# Patient Record
Sex: FEMALE | Race: WHITE | NOT HISPANIC OR LATINO | Employment: UNEMPLOYED | URBAN - METROPOLITAN AREA
[De-identification: names, ages, dates, MRNs, and addresses within clinical notes are randomized per-mention and may not be internally consistent; named-entity substitution may affect disease eponyms.]

---

## 2017-12-21 DIAGNOSIS — F51.01 PRIMARY INSOMNIA: Primary | ICD-10-CM

## 2017-12-21 DIAGNOSIS — F41.9 CHRONIC ANXIETY: ICD-10-CM

## 2017-12-21 RX ORDER — ZOLPIDEM TARTRATE 10 MG/1
10 TABLET ORAL DAILY
Refills: 0 | COMMUNITY
Start: 2017-11-16 | End: 2018-01-08 | Stop reason: SDUPTHER

## 2017-12-21 RX ORDER — LORAZEPAM 2 MG/1
2 TABLET ORAL DAILY PRN
Refills: 0 | COMMUNITY
Start: 2017-11-16 | End: 2019-03-27 | Stop reason: SDUPTHER

## 2017-12-25 RX ORDER — LORAZEPAM 2 MG/1
2 TABLET ORAL 2 TIMES DAILY
Qty: 180 TABLET | Refills: 0 | OUTPATIENT
Start: 2017-12-25

## 2017-12-25 RX ORDER — ZOLPIDEM TARTRATE 10 MG/1
10 TABLET ORAL NIGHTLY PRN
Qty: 90 TABLET | Refills: 0 | OUTPATIENT
Start: 2017-12-25 | End: 2018-03-25

## 2017-12-25 NOTE — TELEPHONE ENCOUNTER
Medication refused due to failing protocol.    Requested Prescriptions   Pending Prescriptions Disp Refills    LORazepam (ATIVAN) 2 MG Tab 180 tablet 0     Sig: Take 1 tablet (2 mg total) by mouth 2 (two) times daily.    Anxiolytics Refill Protocol Failed    12/21/2017  4:38 PM       Failed - Patient seen within 3 months    Last visit with Shari Herring MD: Visit date not found  Last visit in [address] [facility]: Visit date not found    Patient's next visit in [address] [facility]: 1/24/2018          Failed - Med not refilled within 4 weeks       Passed - Patient not pregnant       zolpidem (AMBIEN) 10 mg Tab 90 tablet 0     Sig: Take 1 tablet (10 mg total) by mouth nightly as needed.    There is no refill protocol information for this order      Signed Prescriptions Disp Refills    zolpidem (AMBIEN) 10 mg Tab  0     Sig: Take 10 mg by mouth Daily.    There is no refill protocol information for this order       LORazepam (ATIVAN) 2 MG Tab  0     Sig: Take 2 mg by mouth 2 (two) times daily.    There is no refill protocol information for this order

## 2018-01-08 DIAGNOSIS — G47.00 INSOMNIA, UNSPECIFIED TYPE: Primary | ICD-10-CM

## 2018-01-08 RX ORDER — ZOLPIDEM TARTRATE 10 MG/1
10 TABLET ORAL NIGHTLY PRN
Qty: 90 TABLET | Refills: 0 | Status: SHIPPED | OUTPATIENT
Start: 2018-01-08 | End: 2018-04-18 | Stop reason: SDUPTHER

## 2018-01-24 ENCOUNTER — OFFICE VISIT (OUTPATIENT)
Dept: INTERNAL MEDICINE | Facility: CLINIC | Age: 48
End: 2018-01-24
Payer: MEDICAID

## 2018-01-24 VITALS
HEART RATE: 84 BPM | WEIGHT: 134 LBS | BODY MASS INDEX: 23.74 KG/M2 | DIASTOLIC BLOOD PRESSURE: 72 MMHG | HEIGHT: 63 IN | TEMPERATURE: 98 F | SYSTOLIC BLOOD PRESSURE: 124 MMHG | OXYGEN SATURATION: 98 % | RESPIRATION RATE: 18 BRPM

## 2018-01-24 DIAGNOSIS — Z00.00 ROUTINE MEDICAL EXAM: ICD-10-CM

## 2018-01-24 DIAGNOSIS — Z86.39 HISTORY OF THYROTOXICOSIS: ICD-10-CM

## 2018-01-24 DIAGNOSIS — E28.39 ESTROGEN DEFICIENCY: Primary | ICD-10-CM

## 2018-01-24 DIAGNOSIS — F41.9 CHRONIC ANXIETY: ICD-10-CM

## 2018-01-24 DIAGNOSIS — F51.02 ADJUSTMENT INSOMNIA: ICD-10-CM

## 2018-01-24 DIAGNOSIS — Z12.39 BREAST CANCER SCREENING: ICD-10-CM

## 2018-01-24 PROCEDURE — 99214 OFFICE O/P EST MOD 30 MIN: CPT | Mod: ,,, | Performed by: INTERNAL MEDICINE

## 2018-01-24 RX ORDER — DROSPIRENONE AND ETHINYL ESTRADIOL 0.02-3(28)
1 KIT ORAL DAILY
Qty: 28 TABLET | Refills: 2 | Status: SHIPPED | OUTPATIENT
Start: 2018-01-24 | End: 2018-07-24 | Stop reason: CLARIF

## 2018-01-24 RX ORDER — DROSPIRENONE AND ETHINYL ESTRADIOL 0.02-3(28)
KIT ORAL
Refills: 9 | COMMUNITY
Start: 2018-01-03 | End: 2018-01-24

## 2018-01-24 RX ORDER — PROPRANOLOL HYDROCHLORIDE 20 MG/1
20 TABLET ORAL 3 TIMES DAILY
COMMUNITY
End: 2018-01-24

## 2018-01-24 RX ORDER — DROSPIRENONE AND ETHINYL ESTRADIOL 0.02-3(28)
1 KIT ORAL DAILY
COMMUNITY
End: 2018-01-24 | Stop reason: SDUPTHER

## 2018-01-24 RX ORDER — ACYCLOVIR 400 MG/1
1 TABLET ORAL DAILY
Refills: 1 | COMMUNITY
Start: 2017-11-16 | End: 2018-07-24 | Stop reason: SDUPTHER

## 2018-01-24 RX ORDER — METHIMAZOLE 10 MG/1
10 TABLET ORAL DAILY
Refills: 4 | COMMUNITY
Start: 2017-11-02 | End: 2018-01-24

## 2018-01-24 RX ORDER — METHIMAZOLE 10 MG/1
10 TABLET ORAL DAILY
COMMUNITY
End: 2018-07-24

## 2018-01-24 RX ORDER — SUMATRIPTAN SUCCINATE 100 MG/1
100 TABLET ORAL
COMMUNITY
End: 2020-10-22

## 2018-01-24 RX ORDER — CARISOPRODOL 350 MG/1
350 TABLET ORAL 4 TIMES DAILY PRN
COMMUNITY
End: 2018-01-24

## 2018-01-24 RX ORDER — TRAMADOL HYDROCHLORIDE 50 MG/1
50 TABLET ORAL DAILY PRN
COMMUNITY
End: 2021-01-28

## 2018-01-24 NOTE — PROGRESS NOTES
SUBJECTIVE:    Patient ID: Irish Pierce is a 47 y.o. female.    Chief Complaint: Follow-up (3 month) and Medication Refill    HPI     PT IN FOR RECHECK--     CHRONIC ANXIETY--BETTER NOW POST HER DIVORCE BUT ALL ASIDE SHE IS TAKING LESS ATIVAN    INSOMNIA-IMPROVED-STILL NEEDS THE RX    IBS-GOOD-DING [employer] DRINK WHICH KEEPS HER REGULAR-USING A CLEANSING TEA    FOOT PAIN-PINS AND NEEDLES AND NUMBNESS SINCE SHE TORE HER HAMSTRING        Past Medical History:   Diagnosis Date    Abnormal thyroid ultrasound     Adjustment insomnia     Chronic anxiety     Facial eczema     Herpes simplex     Insomnia     Intractable migraine without aura and without status migrainosus     Irritable colon     Myalgia     Paresthesia of right foot     Primary insomnia     Screening for cervical cancer      Social History     Social History    Marital status: Unknown     Spouse name: N/A    Number of children: N/A    Years of education: N/A     Occupational History    Not on file.     Social History Main Topics    Smoking status: Never Smoker    Smokeless tobacco: Never Used    Alcohol use Yes    Drug use: No    Sexual activity: Not on file     Other Topics Concern    Not on file     Social History Narrative    No narrative on file     Past Surgical History:   Procedure Laterality Date    APPENDECTOMY       Family History   Problem Relation Age of Onset    Cancer Mother     Hypertension Father        Review of Systems   Constitutional: Negative for appetite change, chills, diaphoresis, fatigue, fever and unexpected weight change.   HENT: Negative for congestion, ear pain, hearing loss, nosebleeds, postnasal drip, sinus pain, sinus pressure, sneezing, sore throat, tinnitus, trouble swallowing and voice change.    Eyes: Negative for photophobia, pain, itching and visual disturbance.   Respiratory: Negative for apnea, cough, chest tightness, shortness of breath, wheezing and stridor.    Cardiovascular: Negative for  chest pain, palpitations and leg swelling.   Gastrointestinal: Negative for abdominal distention, abdominal pain, blood in stool, constipation, diarrhea, nausea and vomiting.        IBS WITH CONSTIPATION   Endocrine: Negative for cold intolerance, heat intolerance, polydipsia and polyuria.   Genitourinary: Negative for difficulty urinating, dyspareunia, dysuria, flank pain, frequency, hematuria, menstrual problem, pelvic pain, urgency, vaginal discharge and vaginal pain.   Musculoskeletal: Negative for arthralgias, back pain, joint swelling, myalgias, neck pain and neck stiffness.   Skin: Negative for pallor.   Allergic/Immunologic: Negative for environmental allergies and food allergies.   Neurological: Negative for dizziness, tremors, speech difficulty, weakness, light-headedness and numbness.   Hematological: Does not bruise/bleed easily.   Psychiatric/Behavioral: Negative for agitation, confusion, decreased concentration, sleep disturbance and suicidal ideas. The patient is nervous/anxious.           Objective:      Physical Exam   Constitutional: She is oriented to person, place, and time. She appears well-developed and well-nourished. She is cooperative. No distress.   HENT:   Head: Normocephalic and atraumatic.   Right Ear: Tympanic membrane normal.   Left Ear: Tympanic membrane normal.   Nose: Nose normal.   Mouth/Throat: Uvula is midline, oropharynx is clear and moist and mucous membranes are normal.   Eyes: Conjunctivae, EOM and lids are normal. Pupils are equal, round, and reactive to light. Right pupil is round and reactive. Left pupil is round and reactive.   Neck: Trachea normal and normal range of motion. Neck supple. No JVD present. No thyromegaly present.   Cardiovascular: Normal rate, regular rhythm, normal heart sounds and intact distal pulses.    Pulmonary/Chest: Effort normal and breath sounds normal. No tachypnea. No respiratory distress.   Abdominal: Soft. Bowel sounds are normal. There is no  tenderness.   Musculoskeletal: Normal range of motion.   Lymphadenopathy:     She has no cervical adenopathy.   Neurological: She is alert and oriented to person, place, and time. She has normal strength.   Skin: Skin is warm and dry. No rash noted.   Psychiatric: She has a normal mood and affect. Her speech is normal.   Nursing note and vitals reviewed.          Assessment:       1. Estrogen deficiency    2. History of thyrotoxicosis    3. Adjustment insomnia    4. Chronic anxiety    5. Routine medical exam    6. Breast cancer screening         Plan:           Estrogen deficiency  -     drospirenone-ethinyl estradiol (DEYSI) 3-0.02 mg per tablet; Take 1 tablet by mouth once daily.  Dispense: 28 tablet; Refill: 2    History of thyrotoxicosis  -     TSH; Future; Expected date: 01/24/2018    Adjustment insomnia    Chronic anxiety    Routine medical exam  -     CBC auto differential; Future; Expected date: 01/24/2018  -     Comprehensive metabolic panel; Future; Expected date: 01/24/2018  -     Lipid panel; Future; Expected date: 01/24/2018  -     Urinalysis    Breast cancer screening  -     Mammo Digital Screening Bilat without CA

## 2018-04-18 DIAGNOSIS — F51.02 ADJUSTMENT INSOMNIA: ICD-10-CM

## 2018-04-18 RX ORDER — DROSPIRENONE AND ETHINYL ESTRADIOL 0.02-3(28)
1 KIT ORAL DAILY
Qty: 28 TABLET | Refills: 2 | Status: CANCELLED | OUTPATIENT
Start: 2018-04-18

## 2018-04-18 NOTE — TELEPHONE ENCOUNTER
Also pended referral for gyn in case you wanted a gyn to take over Birth control and address overdue pap.

## 2018-04-18 NOTE — TELEPHONE ENCOUNTER
----- Message from Caitlyn Salazar sent at 4/18/2018  8:17 AM CDT -----  Contact: Irish Gasca is requesting a refill of her birth control and Ambien sent to [facility] on [facility]. She also needs a PA for the birth control.

## 2018-04-19 PROBLEM — F51.02 ADJUSTMENT INSOMNIA: Status: ACTIVE | Noted: 2018-04-19

## 2018-04-19 RX ORDER — ZOLPIDEM TARTRATE 10 MG/1
10 TABLET ORAL NIGHTLY PRN
Qty: 30 TABLET | Refills: 2 | Status: SHIPPED | OUTPATIENT
Start: 2018-04-19 | End: 2018-07-24 | Stop reason: SDUPTHER

## 2018-04-19 NOTE — TELEPHONE ENCOUNTER
Please let her know she needs to follow-up with her gynecologist for birth control refill. We don't have an updated Pap in health maintenance. Refill request sent for Ambien.

## 2018-07-21 LAB
ALBUMIN SERPL-MCNC: 4 G/DL (ref 3.5–5.5)
ALBUMIN/GLOB SERPL: 2.4 {RATIO} (ref 1.2–2.2)
ALP SERPL-CCNC: 91 IU/L (ref 39–117)
ALT SERPL-CCNC: 20 IU/L (ref 0–32)
AMBIG ABBREV CMP 14 DEFAULT: NORMAL
AMBIG ABBREV LP DEFAULT: NORMAL
AST SERPL-CCNC: 18 IU/L (ref 0–40)
BASOPHILS # BLD AUTO: 0 X10E3/UL (ref 0–0.2)
BASOPHILS NFR BLD AUTO: 0 %
BILIRUB SERPL-MCNC: 0.2 MG/DL (ref 0–1.2)
BUN SERPL-MCNC: 8 MG/DL (ref 6–24)
BUN/CREAT SERPL: 22 (ref 9–23)
CALCIUM SERPL-MCNC: 9.5 MG/DL (ref 8.7–10.2)
CHLORIDE SERPL-SCNC: 106 MMOL/L (ref 96–106)
CHOLEST SERPL-MCNC: 139 MG/DL (ref 100–199)
CO2 SERPL-SCNC: 22 MMOL/L (ref 20–29)
CREAT SERPL-MCNC: 0.37 MG/DL (ref 0.57–1)
EGFR IF AFRICAN AMERICAN: 148 ML/MIN/1.73
EOSINOPHIL # BLD AUTO: 0.2 X10E3/UL (ref 0–0.4)
EOSINOPHIL NFR BLD AUTO: 4 %
ERYTHROCYTE [DISTWIDTH] IN BLOOD BY AUTOMATED COUNT: 14.3 % (ref 12.3–15.4)
EST. GFR  (NON AFRICAN AMERICAN): 129 ML/MIN/1.73
GLOBULIN SER CALC-MCNC: 1.7 G/DL (ref 1.5–4.5)
GLUCOSE SERPL-MCNC: 141 MG/DL (ref 65–99)
HCT VFR BLD AUTO: 35.5 % (ref 34–46.6)
HDLC SERPL-MCNC: 94 MG/DL
HGB BLD-MCNC: 11.7 G/DL (ref 11.1–15.9)
IMM GRANULOCYTES # BLD: 0 X10E3/UL (ref 0–0.1)
IMM GRANULOCYTES NFR BLD: 0 %
LDLC SERPL CALC-MCNC: 32 MG/DL (ref 0–99)
LYMPHOCYTES # BLD AUTO: 1.9 X10E3/UL (ref 0.7–3.1)
LYMPHOCYTES NFR BLD AUTO: 43 %
MCH RBC QN AUTO: 27.6 PG (ref 26.6–33)
MCHC RBC AUTO-ENTMCNC: 33 G/DL (ref 31.5–35.7)
MCV RBC AUTO: 84 FL (ref 79–97)
MONOCYTES # BLD AUTO: 0.7 X10E3/UL (ref 0.1–0.9)
MONOCYTES NFR BLD AUTO: 15 %
NEUTROPHILS # BLD AUTO: 1.7 X10E3/UL (ref 1.4–7)
NEUTROPHILS NFR BLD AUTO: 38 %
PLATELET # BLD AUTO: 188 X10E3/UL (ref 150–379)
POTASSIUM SERPL-SCNC: 4.1 MMOL/L (ref 3.5–5.2)
PROT SERPL-MCNC: 5.7 G/DL (ref 6–8.5)
RBC # BLD AUTO: 4.24 X10E6/UL (ref 3.77–5.28)
SODIUM SERPL-SCNC: 142 MMOL/L (ref 134–144)
TRIGL SERPL-MCNC: 65 MG/DL (ref 0–149)
TSH SERPL DL<=0.005 MIU/L-ACNC: <0.006 UIU/ML (ref 0.45–4.5)
VLDLC SERPL CALC-MCNC: 13 MG/DL (ref 5–40)
WBC # BLD AUTO: 4.5 X10E3/UL (ref 3.4–10.8)

## 2018-07-23 DIAGNOSIS — R79.89 DECREASED THYROID STIMULATING HORMONE (TSH) LEVEL: Primary | ICD-10-CM

## 2018-07-23 NOTE — TELEPHONE ENCOUNTER
Get the thyroid studies I ordered prior to recheck and get an A1C when she comes in-we will review the results on clinic visit

## 2018-07-24 ENCOUNTER — OFFICE VISIT (OUTPATIENT)
Dept: INTERNAL MEDICINE | Facility: CLINIC | Age: 48
End: 2018-07-24
Payer: MEDICAID

## 2018-07-24 VITALS
WEIGHT: 131 LBS | DIASTOLIC BLOOD PRESSURE: 74 MMHG | HEART RATE: 99 BPM | OXYGEN SATURATION: 98 % | HEIGHT: 63 IN | RESPIRATION RATE: 17 BRPM | SYSTOLIC BLOOD PRESSURE: 136 MMHG | BODY MASS INDEX: 23.21 KG/M2

## 2018-07-24 DIAGNOSIS — Z86.39 HISTORY OF THYROTOXICOSIS: ICD-10-CM

## 2018-07-24 DIAGNOSIS — Z86.19 HISTORY OF HERPES LABIALIS: ICD-10-CM

## 2018-07-24 DIAGNOSIS — R79.89 DECREASED THYROID STIMULATING HORMONE (TSH) LEVEL: Primary | ICD-10-CM

## 2018-07-24 DIAGNOSIS — R73.09 ELEVATED GLUCOSE: ICD-10-CM

## 2018-07-24 DIAGNOSIS — F51.02 ADJUSTMENT INSOMNIA: ICD-10-CM

## 2018-07-24 PROCEDURE — 99213 OFFICE O/P EST LOW 20 MIN: CPT | Mod: ,,, | Performed by: INTERNAL MEDICINE

## 2018-07-24 PROCEDURE — 83036 HEMOGLOBIN GLYCOSYLATED A1C: CPT | Mod: QW,,, | Performed by: INTERNAL MEDICINE

## 2018-07-24 RX ORDER — ZOLPIDEM TARTRATE 10 MG/1
10 TABLET ORAL NIGHTLY PRN
Qty: 30 TABLET | Refills: 2 | Status: SHIPPED | OUTPATIENT
Start: 2018-07-24 | End: 2018-10-24 | Stop reason: SDUPTHER

## 2018-07-24 RX ORDER — ACYCLOVIR 400 MG/1
400 TABLET ORAL DAILY
Qty: 30 TABLET | Refills: 1 | Status: SHIPPED | OUTPATIENT
Start: 2018-07-24 | End: 2019-07-23 | Stop reason: SDUPTHER

## 2018-07-24 NOTE — PROGRESS NOTES
"  SUBJECTIVE:    Patient ID: Irish Pierce is a 47 y.o. female.    Chief Complaint: Thyroid Problem    HPI     She comes for thyroid evaluation-TSH is low-hx hyperthyroidism on Rx but she stopped it after last ov--she has no signs of hyperthyroidism but it looks like she is going to relapse which needs to be evaluated again    NF glucose was 141--but we need to do AC to check --AC 4.7    Insomnia-without Ambien sleep is poor      Past Medical History:   Diagnosis Date    Abnormal thyroid ultrasound     Adjustment insomnia     Chronic anxiety     Facial eczema     Herpes simplex     Insomnia     Intractable migraine without aura and without status migrainosus     Irritable colon     Myalgia     Paresthesia of right foot     Primary insomnia     Screening for cervical cancer      Social History     Social History    Marital status: Unknown     Spouse name: N/A    Number of children: N/A    Years of education: N/A     Occupational History    Not on file.     Social History Main Topics    Smoking status: Never Smoker    Smokeless tobacco: Never Used    Alcohol use Yes    Drug use: No    Sexual activity: Not on file     Other Topics Concern    Not on file     Social History Narrative    No narrative on file     Past Surgical History:   Procedure Laterality Date    APPENDECTOMY       Family History   Problem Relation Age of Onset    Cancer Mother     Hypertension Father      Vitals:    07/24/18 0937   BP: 136/74   Pulse: 99   Resp: 17   SpO2: 98%   Weight: 59.4 kg (131 lb)   Height: 5' 3" (1.6 m)       Review of Systems   Constitutional: Negative for appetite change, chills, diaphoresis, fatigue and fever.   HENT: Negative for congestion, ear pain, hearing loss, sore throat and trouble swallowing.    Eyes: Negative for photophobia, pain and visual disturbance.   Respiratory: Negative for cough, chest tightness and shortness of breath.    Cardiovascular: Negative for chest pain, palpitations and " leg swelling.   Gastrointestinal: Negative for abdominal pain, blood in stool, constipation, diarrhea, nausea and vomiting.   Endocrine: Negative for cold intolerance and heat intolerance.   Genitourinary: Negative for difficulty urinating, flank pain, pelvic pain and vaginal pain.   Musculoskeletal: Negative for arthralgias and myalgias.   Skin: Negative for rash.   Allergic/Immunologic: Negative for immunocompromised state.   Neurological: Negative for dizziness, weakness, light-headedness and headaches.   Hematological: Negative for adenopathy. Does not bruise/bleed easily.   Psychiatric/Behavioral: Negative for confusion, self-injury and suicidal ideas.          Objective:      Physical Exam   Constitutional: She is oriented to person, place, and time. She appears well-developed and well-nourished. She is cooperative.   HENT:   Head: Normocephalic and atraumatic.   Right Ear: Tympanic membrane normal.   Left Ear: Tympanic membrane normal.   Eyes: Conjunctivae, EOM and lids are normal. Pupils are equal, round, and reactive to light. Lids are everted and swept, no foreign bodies found. Right pupil is round and reactive. Left pupil is round and reactive.   Neck: Trachea normal and normal range of motion. Neck supple.   Cardiovascular: Normal rate, regular rhythm, S1 normal, S2 normal, normal heart sounds and intact distal pulses.    Pulmonary/Chest: Breath sounds normal.   Abdominal: Soft. Bowel sounds are normal. There is no rigidity and no guarding.   Musculoskeletal: Normal range of motion.   Lymphadenopathy:     She has no cervical adenopathy.     She has no axillary adenopathy.   Neurological: She is alert and oriented to person, place, and time.   Skin: Skin is warm and dry. Capillary refill takes less than 2 seconds.   Psychiatric: She has a normal mood and affect. Her behavior is normal. Judgment and thought content normal.   Nursing note and vitals reviewed.          Assessment:       1. Decreased thyroid  stimulating hormone (TSH) level    2. Adjustment insomnia    3. History of thyrotoxicosis    4. History of herpes labialis    5. Elevated glucose         Plan:           Decreased thyroid stimulating hormone (TSH) level  -     ST., free; Future; Expected date: 07/24/2018  -     ST., free; Future; Expected date: 07/24/2018    Adjustment insomnia  -     zolpidem (AMBIEN) 10 mg Tab; Take 1 tablet (10 mg total) by mouth nightly as needed.  Dispense: 30 tablet; Refill: 2    History of thyrotoxicosis        -      6 and 24 hour thyroid uptake if FT# and FT elevated    History of herpes labialis  -     acyclovir (ZOVIRAX) 400 MG tablet; Take 1 tablet (400 mg total) by mouth Daily.  Dispense: 30 tablet; Refill: 1    Elevated glucose  -     Hemoglobin AC, PO CT

## 2018-07-25 LAB — HBA1C MFR BLD: 4.7 %

## 2018-10-23 ENCOUNTER — TELEPHONE (OUTPATIENT)
Dept: FAMILY MEDICINE | Facility: CLINIC | Age: 48
End: 2018-10-23

## 2018-10-24 ENCOUNTER — OFFICE VISIT (OUTPATIENT)
Dept: FAMILY MEDICINE | Facility: CLINIC | Age: 48
End: 2018-10-24
Payer: MEDICAID

## 2018-10-24 VITALS
SYSTOLIC BLOOD PRESSURE: 134 MMHG | BODY MASS INDEX: 23.21 KG/M2 | RESPIRATION RATE: 14 BRPM | OXYGEN SATURATION: 98 % | TEMPERATURE: 98 F | DIASTOLIC BLOOD PRESSURE: 60 MMHG | HEART RATE: 102 BPM | WEIGHT: 131 LBS | HEIGHT: 63 IN

## 2018-10-24 DIAGNOSIS — D72.820 LYMPHOCYTOSIS: ICD-10-CM

## 2018-10-24 DIAGNOSIS — E05.90 HYPERTHYROIDISM: Primary | ICD-10-CM

## 2018-10-24 DIAGNOSIS — Z86.39 HISTORY OF THYROTOXICOSIS: ICD-10-CM

## 2018-10-24 DIAGNOSIS — F51.02 ADJUSTMENT INSOMNIA: ICD-10-CM

## 2018-10-24 DIAGNOSIS — D70.9 NEUTROPENIA, UNSPECIFIED TYPE: ICD-10-CM

## 2018-10-24 PROCEDURE — 99214 OFFICE O/P EST MOD 30 MIN: CPT | Mod: ,,, | Performed by: INTERNAL MEDICINE

## 2018-10-24 RX ORDER — METHIMAZOLE 10 MG/1
10 TABLET ORAL DAILY
Qty: 30 TABLET | Refills: 3 | Status: SHIPPED | OUTPATIENT
Start: 2018-10-24 | End: 2019-03-28

## 2018-10-24 RX ORDER — ZOLPIDEM TARTRATE 10 MG/1
10 TABLET ORAL NIGHTLY PRN
Qty: 30 TABLET | Refills: 2 | Status: SHIPPED | OUTPATIENT
Start: 2018-10-24 | End: 2018-12-05 | Stop reason: SDUPTHER

## 2018-10-24 RX ORDER — PROPRANOLOL HYDROCHLORIDE 10 MG/1
10 TABLET ORAL 3 TIMES DAILY
Qty: 90 TABLET | Refills: 2 | Status: SHIPPED | OUTPATIENT
Start: 2018-10-24 | End: 2019-05-05 | Stop reason: SDUPTHER

## 2018-10-24 RX ORDER — METHIMAZOLE 10 MG/1
1 TABLET ORAL DAILY
Refills: 3 | COMMUNITY
Start: 2018-09-29 | End: 2018-10-24 | Stop reason: SDUPTHER

## 2018-10-24 NOTE — PATIENT INSTRUCTIONS
Hyperthyroidism    You have hyperthyroidism. This means you have a thyroid gland that makes too much thyroid hormone. This hormone is vital to body growth and metabolism. If you make too much thyroid hormone, many body processes speed up and may not work right. This can cause symptoms throughout the body.  There are a number of causes of hyperthyroidism. The most common cause is Graves disease. This occurs when the bodys immune system causes the thyroid to grow and make more thyroid hormone than needed.  Symptoms of hyperthyroidism include:  · Nervousness, anxiety, irritability  · Shaking (tremors) that affects the hands and fingers  · Weight loss despite having a normal or increased appetite  · Low tolerance to heat  · Sweating more than normal  · Fast or irregular heartbeat  · Lighter or irregular periods (women only)  · More frequent bowel movements  · Enlarged thyroid gland (goiter)  · Bulging eyes  · Problems sleeping  · Muscle weakness  · Fatigue  · Swelling of the hands, ankles, or feet (older adults only)  Treatment for hyperthyroidism may include taking medicines. For instance, antithyroid medicines may be prescribed. These help lower the amount of thyroid hormone made by the thyroid gland. Beta-blockers may be prescribed as well. Tips for taking medicines are given below.  Radioiodine ablation or surgery may also be advised. Your healthcare provider will tell you more about these options if needed.  Home care  Tips for taking medicines  · Take any medicines youre prescribed as directed.  · Take your medicine at the same times each day.  · Use a pillbox labeled with the days of the week. This will help you remember to take your medicine each day.  · Tell your provider if you have any side effects from your medicines that bother you.  · Never stop taking medicines on your own. If you do, your symptoms will return.  General care  · Always talk with your provider before trying other medicines or  treatments for your thyroid problem.  · If you see other healthcare providers, be sure to let them know about your thyroid problem.  Follow-up care  See your healthcare provider for checkups as advised. You may need regular tests to check the level of thyroid hormone in your blood.  When to seek medical advice  Call your healthcare provider right away if any of these occur:  · New symptoms occur  · Symptoms return, continue, or worsen even after treatment  · Extreme fatigue  · Puffy hands, face, or feet  · Confusion  Call 911  Call 911 right away if any of these occur:  · Fainting  · Chest pain  · Shortness of breath or trouble breathing  Date Last Reviewed: 8/24/2015 © 2000-2017 [employer]. [address] [place] [address], [place] [address]. All rights reserved. This information is not intended as a substitute for professional medical care. Always follow your healthcare professional's instructions.        Methimazole tablets  What is this medicine?  METHIMAZOLE (meth IM a zole) prevents the thyroid gland from producing too much thyroid hormone. It is used to treat a condition known as hyperthyroidism.  How should I use this medicine?  Take this medicine by mouth with a glass of water. Follow the directions on the prescription label. You can take this medicine with or without food. However, you should always take it the same way to make sure the effects are the same. Take your doses at regular intervals. Do not take your medicine more often than directed. Do not stop taking this medicine except on the advice of your doctor or health care professional.  Talk to your pediatrician regarding the use of this medicine in children. Special care may be needed. While this drug may be prescribed for children for selected conditions, precautions do apply.  What side effects may I notice from receiving this medicine?  Side effects that you should report to your doctor or health care professional as soon as  possible:  · black, tarry stools  · fever, sore throat, hoarseness  · numbness or tingling in the hands or feet  · severe redness or itching of the skin, or dry cracked skin  · stomach pain  · swelling of the feet or legs  · unusual bleeding or bruising, pinpoint red spots on the skin  · unusual or sudden weight increase  · unusually weak or tired  · yellowing of skin or eyes  Side effects that usually do not require medical attention (report to your doctor or health care professional if they continue or are bothersome):  · headache  · nausea, vomiting  · mild skin rash, itching  · muscle aches and pains  What may interact with this medicine?  Do not take this medicine with any of the following medications:  · sodium iodide  · thyroid hormones  This medicine may also interact with the following medications:  · certain medicines for high blood pressure like metoprolol and propranolol  · digoxin  · theophylline  · warfarin  What if I miss a dose?  If you miss a dose, take it as soon as you can. If it is almost time for your next dose, take only that dose. Do not take double or extra doses.  Where should I keep my medicine?  Keep out of the reach of children.  Store at room temperature between 15 and 30 degrees C (59 and 86 degrees F). Throw away any unused medicine after the expiration date.  What should I tell my health care provider before I take this medicine?  They need to know if you have any of these conditions:  · bone marrow disease  · liver disease  · an unusual or allergic reaction to methimazole, other medicines, foods, dyes, or preservatives  · pregnant or trying to get pregnant  · breast-feeding  What should I watch for while using this medicine?  Visit your doctor or health care professional for regular checks on your progress. Your thyroid hormone levels will need to be checked.  This medicine can reduce your resistance to infection. Contact your doctor or health care professional if you have any  infection or injury. Avoid people who have colds, flu, bronchitis or other infectious disease. Do not have any vaccinations without asking your doctor or health care professional. Avoid people who have recently received oral polio vaccine.  NOTE:This sheet is a summary. It may not cover all possible information. If you have questions about this medicine, talk to your doctor, pharmacist, or health care provider. Copyright© 2017 [employer]

## 2018-10-24 NOTE — PROGRESS NOTES
SUBJECTIVE:    Patient ID: Irish Pierce is a 47 y.o. female.    Chief Complaint: Hyperthyroidism and Follow-up    HPI     She comes in for recheck she has had the majority of tests needed to be run. Lipid panel revealed cholesterol of 172, with HDL of 121 which is excellent . LDL was only 37 triglyceride 61- complete metabolic panel revealed a BUN/creatinine ratio of 35 but BUN was 11 creatinine was low at 0.31 and that reflects excellent renal function. liver function studies are normal A1c is 4.9 and random glucose was 88- white blood cell count is low at 2.6 about which I am concerned that the  lymphocyte count is 54% as opposed to. She states that an aunt  has leukemia.(LABS ARE BROUGHT IN BY PT FROM OUTSIDE-- SHE HAD NORMAL WBC OF 4.5 IN July AND IT WAS AFTER THIS SHE STARTED THE TAPAZOLE)    Major concern today is hyperthyroidism and the patient does not want to pursue radiation at this time she prefers to return to previous medication which is Tapazole. At last office visit we discussed my recommendation for treatment but she wanted to wait because she had no sx and felt she wanted to repeat the thyroid studies. This is a repeat performance for her with hyperthyroidism and she ON HER OWN (had some tapazole Dr Deyanira Valenzuela had prescribed for her during her first treatment for hyperthyroidism) had  restarted it due to some sx of sweats and discomfort she recognized as those oh hyperthyroidism. Although we will continue her Tapazole she will need to be followed very closely by endocrinology.    Urinalysis reveals 2+ blood in the urine but she was in the midst of her menstrual period and this can be repeated . Iron and B12 levels are normal. C-reactive protein 1.9- DHEA level is normal- total cortisol was normal- FSH does not reflect menopause although estradiol is low so we could be in a perimenopausal situation- vitamin D is normal-.    I am very concerned regarding her low WBC of 2.6 with this  lymphocytosis-While it is known tapazole can reduce wbc, I have spoken with hematology regarding my concerns and requested urgent evaluation for differentiation of this neutropenia.We will also get Endocrinology involved with this second thyroid hyperfunction.    Office Visit on 07/24/2018   Component Date Value Ref Range Status    Hemoglobin A1C 07/25/2018 4.7   Final   Orders Only on 07/20/2018   Component Date Value Ref Range Status    WBC 07/20/2018 4.5  3.4 - 10.8 x10E3/uL Final    RBC 07/20/2018 4.24  3.77 - 5.28 x10E6/uL Final    Hemoglobin 07/20/2018 11.7  11.1 - 15.9 g/dL Final    Hematocrit 07/20/2018 35.5  34.0 - 46.6 % Final    MCV 07/20/2018 84  79 - 97 fL Final    MCH 07/20/2018 27.6  26.6 - 33.0 pg Final    MCHC 07/20/2018 33.0  31.5 - 35.7 g/dL Final    RDW 07/20/2018 14.3  12.3 - 15.4 % Final    Platelets 07/20/2018 188  150 - 379 x10E3/uL Final    Neutrophils 07/20/2018 38  Not Estab. % Final    Lymph% 07/20/2018 43  Not Estab. % Final    Mono% 07/20/2018 15  Not Estab. % Final    Eosinophil% 07/20/2018 4  Not Estab. % Final    Basophil% 07/20/2018 0  Not Estab. % Final    Neutrophils Absolute 07/20/2018 1.7  1.4 - 7.0 x10E3/uL Final    Lymph # 07/20/2018 1.9  0.7 - 3.1 x10E3/uL Final    Mono # 07/20/2018 0.7  0.1 - 0.9 x10E3/uL Final    Eos # 07/20/2018 0.2  0.0 - 0.4 x10E3/uL Final    Baso # 07/20/2018 0.0  0.0 - 0.2 x10E3/uL Final    Immature Granulocytes 07/20/2018 0  Not Estab. % Final    Immature Grans (Abs) 07/20/2018 0.0  0.0 - 0.1 x10E3/uL Final    Glucose 07/20/2018 141* 65 - 99 mg/dL Final    BUN, Bld 07/20/2018 8  6 - 24 mg/dL Final    Creatinine 07/20/2018 0.37* 0.57 - 1.00 mg/dL Final    eGFR if non African American 07/20/2018 129  >59 mL/min/1.73 Final    eGFR if  07/20/2018 148  >59 mL/min/1.73 Final    BUN/Creatinine Ratio 07/20/2018 22  9 - 23 Final    Sodium 07/20/2018 142  134 - 144 mmol/L Final    Potassium 07/20/2018 4.1  3.5 - 5.2  mmol/L Final    Chloride 07/20/2018 106  96 - 106 mmol/L Final    CO2 07/20/2018 22  20 - 29 mmol/L Final    Calcium 07/20/2018 9.5  8.7 - 10.2 mg/dL Final    Total Protein 07/20/2018 5.7* 6.0 - 8.5 g/dL Final    Albumin 07/20/2018 4.0  3.5 - 5.5 g/dL Final    Globulin, Total 07/20/2018 1.7  1.5 - 4.5 g/dL Final    Albumin/Globulin Ratio 07/20/2018 2.4* 1.2 - 2.2 Final    Total Bilirubin 07/20/2018 0.2  0.0 - 1.2 mg/dL Final    Alkaline Phosphatase 07/20/2018 91  39 - 117 IU/L Final    AST 07/20/2018 18  0 - 40 IU/L Final    ALT 07/20/2018 20  0 - 32 IU/L Final    Cholesterol 07/20/2018 139  100 - 199 mg/dL Final    Triglycerides 07/20/2018 65  0 - 149 mg/dL Final    HDL 07/20/2018 94  >39 mg/dL Final    VLDL Cholesterol Henri 07/20/2018 13  5 - 40 mg/dL Final    LDL Calculated 07/20/2018 32  0 - 99 mg/dL Final    TSH 07/20/2018 <0.006* 0.450 - 4.500 uIU/mL Final    Ray Russ CMP 14 Default 07/20/2018 Comment   Final    Ray Russ LP Default 07/20/2018 Comment   Final       Past Medical History:   Diagnosis Date    Abnormal thyroid ultrasound     Adjustment insomnia     Chronic anxiety     Facial eczema     Herpes simplex     Insomnia     Intractable migraine without aura and without status migrainosus     Irritable colon     Myalgia     Paresthesia of right foot     Primary insomnia     Screening for cervical cancer      Social History     Socioeconomic History    Marital status:      Spouse name: Not on file    Number of children: Not on file    Years of education: Not on file    Highest education level: Not on file   Social Needs    Financial resource strain: Not on file    Food insecurity - worry: Not on file    Food insecurity - inability: Not on file    Transportation needs - medical: Not on file    Transportation needs - non-medical: Not on file   Occupational History    Not on file   Tobacco Use    Smoking status: Never Smoker    Smokeless tobacco: Never  "Used   Substance and Sexual Activity    Alcohol use: Yes    Drug use: No    Sexual activity: Not on file   Other Topics Concern    Not on file   Social History Narrative    Not on file     Past Surgical History:   Procedure Laterality Date    APPENDECTOMY       Family History   Problem Relation Age of Onset    Cancer Mother     Hypertension Father      Vitals:    10/24/18 1322   BP: 134/60   Pulse: 102   Resp: 14   Temp: 98 °F (36.7 °C)   SpO2: 98%   Weight: 59.4 kg (131 lb)   Height: 5' 3" (1.6 m)       Review of Systems   Constitutional: Negative for appetite change, chills, diaphoresis, fatigue, fever and unexpected weight change.        Eating well, eating super-foods and anti-oxidants and daily supplements-drinks lots of pure water-exercises three times a week   HENT: Negative for congestion, ear pain, hearing loss, nosebleeds, postnasal drip, sinus pressure, sinus pain, sneezing, sore throat, tinnitus, trouble swallowing and voice change.         Hears differently like underwater   Eyes: Negative for photophobia, pain, itching and visual disturbance.   Respiratory: Negative for apnea, cough, chest tightness, shortness of breath, wheezing and stridor.    Cardiovascular: Negative for chest pain, palpitations and leg swelling.   Gastrointestinal: Negative for abdominal distention, abdominal pain, blood in stool, constipation, diarrhea, nausea and vomiting.   Endocrine: Negative for cold intolerance, heat intolerance, polydipsia and polyuria.   Genitourinary: Negative for difficulty urinating, dyspareunia, dysuria, flank pain, frequency, hematuria, menstrual problem, pelvic pain, urgency, vaginal discharge and vaginal pain.   Musculoskeletal: Negative for arthralgias, back pain, joint swelling, myalgias, neck pain and neck stiffness.   Skin: Negative for pallor.   Allergic/Immunologic: Negative for environmental allergies and food allergies.   Neurological: Negative for dizziness, tremors, speech " difficulty, weakness, light-headedness and numbness.   Hematological: Does not bruise/bleed easily.   Psychiatric/Behavioral: Negative for agitation, confusion, decreased concentration, sleep disturbance and suicidal ideas. The patient is not nervous/anxious (much improved).           Objective:      Physical Exam   Constitutional: She is oriented to person, place, and time. She appears well-developed and well-nourished. She is cooperative. No distress.   HENT:   Head: Normocephalic and atraumatic.   Right Ear: Tympanic membrane normal.   Left Ear: Tympanic membrane normal.   Nose: Nose normal.   Mouth/Throat: Uvula is midline, oropharynx is clear and moist and mucous membranes are normal.   Eyes: Conjunctivae and EOM are normal. Right pupil is round and reactive. Left pupil is round and reactive.   Neck: Trachea normal and normal range of motion. Neck supple. No JVD present. No thyromegaly present.   Cardiovascular: Normal rate, regular rhythm, normal heart sounds and intact distal pulses.   Pulmonary/Chest: Effort normal and breath sounds normal. No tachypnea. No respiratory distress.   Abdominal: Soft. Bowel sounds are normal. There is no tenderness.   No hepatosplenomegaly   Musculoskeletal: Normal range of motion.   Lymphadenopathy:     She has no cervical adenopathy.   Neurological: She is alert and oriented to person, place, and time. She has normal strength.   Skin: Skin is warm and dry. No rash noted.   Psychiatric: She has a normal mood and affect. Her speech is normal.   Nursing note and vitals reviewed.          Assessment:       1. Hyperthyroidism    2. History of thyrotoxicosis    3. Neutropenia, unspecified type    4. Lymphocytosis    5. Adjustment insomnia         Plan:           Hyperthyroidism  -     Ambulatory referral to Hematology / Oncology  -     Ambulatory referral to Endocrinology        -     Hesitantly I have to continue tapazole because she needs to get to Endo and she is symptomatic-         -     Methimazole 10 mg no. 30 with 2 refills        -     Propranolol 10 tid no 90 with 2 refills    History of thyrotoxicosis  -     Ambulatory referral to Hematology / Oncology  -     Ambulatory referral to Endocrinology    Neutropenia, unspecified type  -     Ambulatory referral to Hematology / Oncology  -     Ambulatory referral to Endocrinology    Lymphocytosis  -     Ambulatory referral to Hematology / Oncology  -     Ambulatory referral to Endocrinology    Adjustment insomnia  -     zolpidem (AMBIEN) 10 mg Tab; Take 1 tablet (10 mg total) by mouth nightly as needed.  Dispense: 30 tablet; Refill: 2    Other orders  -     TRINESSA, 28, 0.18/0.215/0.25 mg-35 mcg (28) tablet; Take 1 tablet by mouth once daily.  Dispense: 28 tablet; Refill: 2

## 2018-10-30 ENCOUNTER — TELEPHONE (OUTPATIENT)
Dept: FAMILY MEDICINE | Facility: CLINIC | Age: 48
End: 2018-10-30

## 2018-10-30 ENCOUNTER — TELEPHONE (OUTPATIENT)
Dept: HEMATOLOGY/ONCOLOGY | Facility: CLINIC | Age: 48
End: 2018-10-30

## 2018-11-06 ENCOUNTER — LAB VISIT (OUTPATIENT)
Dept: LAB | Facility: HOSPITAL | Age: 48
End: 2018-11-06
Attending: STUDENT IN AN ORGANIZED HEALTH CARE EDUCATION/TRAINING PROGRAM
Payer: MEDICAID

## 2018-11-06 ENCOUNTER — INITIAL CONSULT (OUTPATIENT)
Dept: HEMATOLOGY/ONCOLOGY | Facility: CLINIC | Age: 48
End: 2018-11-06
Payer: MEDICAID

## 2018-11-06 VITALS
TEMPERATURE: 98 F | BODY MASS INDEX: 23.09 KG/M2 | DIASTOLIC BLOOD PRESSURE: 59 MMHG | RESPIRATION RATE: 18 BRPM | OXYGEN SATURATION: 96 % | WEIGHT: 130.31 LBS | HEIGHT: 63 IN | SYSTOLIC BLOOD PRESSURE: 123 MMHG | HEART RATE: 93 BPM

## 2018-11-06 DIAGNOSIS — D70.8 OTHER NEUTROPENIA: ICD-10-CM

## 2018-11-06 DIAGNOSIS — D70.8 OTHER NEUTROPENIA: Primary | ICD-10-CM

## 2018-11-06 LAB
BASOPHILS # BLD AUTO: 0.01 K/UL
BASOPHILS NFR BLD: 0.2 %
DIFFERENTIAL METHOD: ABNORMAL
EOSINOPHIL # BLD AUTO: 0.1 K/UL
EOSINOPHIL NFR BLD: 1.5 %
ERYTHROCYTE [DISTWIDTH] IN BLOOD BY AUTOMATED COUNT: 13.3 %
FOLATE SERPL-MCNC: 13.5 NG/ML
HCT VFR BLD AUTO: 35.4 %
HGB BLD-MCNC: 12.3 G/DL
IMM GRANULOCYTES # BLD AUTO: 0 K/UL
IMM GRANULOCYTES NFR BLD AUTO: 0 %
LDH SERPL L TO P-CCNC: 173 U/L
LYMPHOCYTES # BLD AUTO: 2.1 K/UL
LYMPHOCYTES NFR BLD: 50.1 %
MCH RBC QN AUTO: 28.7 PG
MCHC RBC AUTO-ENTMCNC: 34.7 G/DL
MCV RBC AUTO: 83 FL
MONOCYTES # BLD AUTO: 0.5 K/UL
MONOCYTES NFR BLD: 11.7 %
NEUTROPHILS # BLD AUTO: 1.5 K/UL
NEUTROPHILS NFR BLD: 36.5 %
NRBC BLD-RTO: 0 /100 WBC
PLATELET # BLD AUTO: 207 K/UL
PMV BLD AUTO: 10.1 FL
RBC # BLD AUTO: 4.28 M/UL
WBC # BLD AUTO: 4.11 K/UL

## 2018-11-06 PROCEDURE — 83615 LACTATE (LD) (LDH) ENZYME: CPT

## 2018-11-06 PROCEDURE — 85060 BLOOD SMEAR INTERPRETATION: CPT | Mod: ,,, | Performed by: PATHOLOGY

## 2018-11-06 PROCEDURE — 36415 COLL VENOUS BLD VENIPUNCTURE: CPT

## 2018-11-06 PROCEDURE — 82607 VITAMIN B-12: CPT

## 2018-11-06 PROCEDURE — 99205 OFFICE O/P NEW HI 60 MIN: CPT | Mod: S$PBB,,, | Performed by: STUDENT IN AN ORGANIZED HEALTH CARE EDUCATION/TRAINING PROGRAM

## 2018-11-06 PROCEDURE — 83921 ORGANIC ACID SINGLE QUANT: CPT

## 2018-11-06 PROCEDURE — 82746 ASSAY OF FOLIC ACID SERUM: CPT

## 2018-11-06 PROCEDURE — 99213 OFFICE O/P EST LOW 20 MIN: CPT | Mod: PBBFAC | Performed by: STUDENT IN AN ORGANIZED HEALTH CARE EDUCATION/TRAINING PROGRAM

## 2018-11-06 PROCEDURE — 85025 COMPLETE CBC W/AUTO DIFF WBC: CPT

## 2018-11-06 PROCEDURE — 82525 ASSAY OF COPPER: CPT

## 2018-11-06 PROCEDURE — 99999 PR PBB SHADOW E&M-EST. PATIENT-LVL III: CPT | Mod: PBBFAC,,, | Performed by: STUDENT IN AN ORGANIZED HEALTH CARE EDUCATION/TRAINING PROGRAM

## 2018-11-07 LAB
PATH REV BLD -IMP: NORMAL
PATH REV BLD -IMP: NORMAL
VIT B12 SERPL-MCNC: 320 NG/L

## 2018-11-07 NOTE — PROGRESS NOTES
Subjective:       Patient ID: Irish Pierce is a 47 y.o. female.    Chief Complaint: lymphocytosis and Results    Patient is a 46yo WF with PMHx of hyperthyroidism who presents today for initial consultation of neutropenia. Patient reports she was first diagnosed with hyperthyroidism in 2015, and she was placed on methimazole at that time. She self discontinued methimazole 09/2017-09/2018. She had recurrence of symptoms of hot flashes and palpitations, which prompted resumption of her methimazole. She reports being largely asymptomatic other than hot flashes. An NP at her place of employment letty a number of routine labs and found her to be neutropenic. She saw her PCP with the blood work and was subsequently referred to Hematology. Today she remains with hot flashes and some tremors. She denies fevers, chills, night sweats, fatigue, weight change, lymphadenopathy. She describes several family members with solid malignancies but also noted a paternal aunt with CML. Denies any other personal or family h/o hematologic disorders. She does endorse taking nutritional supplements made by a company names [employer]. No other complaints today.      Review of Systems   Constitutional: Negative for chills, fatigue, fever and unexpected weight change.   HENT: Negative for sore throat and trouble swallowing.    Respiratory: Negative for cough and shortness of breath.    Cardiovascular: Negative for chest pain and palpitations.   Gastrointestinal: Negative for abdominal pain, nausea and vomiting.   Genitourinary: Negative for dysuria and hematuria.   Musculoskeletal: Negative for arthralgias and myalgias.   Skin: Negative for rash and wound.   Neurological: Positive for tremors. Negative for seizures and syncope.   Hematological: Negative for adenopathy.   Psychiatric/Behavioral: Negative for agitation and confusion.       Allergies:  Review of patient's allergies indicates:  No Known Allergies    Medications:  Current Outpatient  Medications   Medication Sig Dispense Refill    acyclovir (ZOVIRAX) 400 MG tablet Take 1 tablet (400 mg total) by mouth Daily. 30 tablet 1    LORazepam (ATIVAN) 2 MG Tab Take 2 mg by mouth daily as needed.   0    sumatriptan (IMITREX) 100 MG tablet Take 100 mg by mouth every 2 (two) hours as needed for Migraine.      traMADol (ULTRAM) 50 mg tablet Take 50 mg by mouth daily as needed for Pain.       TRINESSA, 28, 0.18/0.215/0.25 mg-35 mcg (28) tablet Take 1 tablet by mouth once daily. 28 tablet 2    zolpidem (AMBIEN) 10 mg Tab Take 1 tablet (10 mg total) by mouth nightly as needed. 30 tablet 2    methIMAzole (TAPAZOLE) 10 MG Tab Take 1 tablet (10 mg total) by mouth once daily. 30 tablet 3    propranolol (INDERAL) 10 MG tablet Take 1 tablet (10 mg total) by mouth 3 (three) times daily. 90 tablet 2     No current facility-administered medications for this visit.        PMH:  Past Medical History:   Diagnosis Date    Abnormal thyroid ultrasound     Adjustment insomnia     Chronic anxiety     Facial eczema     Herpes simplex     Insomnia     Intractable migraine without aura and without status migrainosus     Irritable colon     Myalgia     Paresthesia of right foot     Primary insomnia     Screening for cervical cancer        PSH:  Past Surgical History:   Procedure Laterality Date    APPENDECTOMY         FamHx:  Family History   Problem Relation Age of Onset    Cancer Mother         ovarian    Hypertension Father     Cancer Father         prostate    Cancer Maternal Aunt         ovarian    Cancer Paternal Aunt          CML       SocHx:  Social History     Socioeconomic History    Marital status:      Spouse name: Not on file    Number of children: Not on file    Years of education: Not on file    Highest education level: Not on file   Social Needs    Financial resource strain: Not on file    Food insecurity - worry: Not on file    Food insecurity - inability: Not on file     Transportation needs - medical: Not on file    Transportation needs - non-medical: Not on file   Occupational History    Not on file   Tobacco Use    Smoking status: Never Smoker    Smokeless tobacco: Never Used   Substance and Sexual Activity    Alcohol use: Yes    Drug use: No    Sexual activity: Not on file   Other Topics Concern    Not on file   Social History Narrative    Not on file       Distress Score    Distress Score: 0        Practical Problems Physical Problems   : No Appearance: No   Housing: No Bathing / Dressing: No   Insurance / Financial: No Breathing: No    Transportation: No  Changes in Urination: No    Work / School: No  Constipation: No   Treatment Decisions: No  Diarrhea: No     Eating: No    Family Problems Fatigue: No    Dealing with Children: No Feeling Swollen: No    Dealing with Partner: No Fevers: No    Ability to Have Children: No  Getting Around: No    Family Health Issues: No  Indigestion: No     Memory / Concentration: No   Emotional Problems Mouth Sores: No    Depression: No  Nausea: No    Fears: No  Nose Dry / Congested: No    Nervousness: No  Pain: No    Sadness: No Sexual: No    Worry: No Skin Dry / Itchy: No    Loss of Interest in Usual Activities: No Sleep: No     Substance Abuse: No    Spiritual/Religions Concerns Tingling in Hands / Feet: No   Spritual / Scientology Concerns: No         Other Problems            Objective:      Physical Exam   Constitutional: She is oriented to person, place, and time. She appears well-developed and well-nourished. No distress.   HENT:   Head: Normocephalic and atraumatic.   Right Ear: External ear normal.   Left Ear: External ear normal.   Eyes: EOM are normal. Pupils are equal, round, and reactive to light. Right eye exhibits no discharge. Left eye exhibits no discharge.   Neck: Normal range of motion. Neck supple. No tracheal deviation present.   Cardiovascular: Normal rate and regular rhythm. Exam reveals no friction rub.    No murmur heard.  Pulmonary/Chest: Effort normal and breath sounds normal. No stridor. No respiratory distress.   Abdominal: Soft. Bowel sounds are normal. She exhibits no distension. There is no tenderness.   Musculoskeletal: Normal range of motion. She exhibits no edema or deformity.   Neurological: She is alert and oriented to person, place, and time.   Skin: Skin is warm and dry. She is not diaphoretic.   Psychiatric: She has a normal mood and affect. Her behavior is normal.       Assessment:       1. Other neutropenia        Plan:   1. Neutropenia  - CBC (7/20/18) with WBC 4.5, ANC 1700, ALC 1900, Hgb 11.7,   - CBC (10/17/18) with WBC 2.6, , ALC 1407, Hgb 13.3,   - while lymphocyte percentage mildly elevated, the ALC is wnl  - suspect neutropenia related to her methimazole and less likely an underlying hematologic malignancy   - will recheck CBC today and evaluate other causes of neutropenia  - advised patient to bring in supplements to review contents  - recommend restarting methimazole for her hyperthyroidism  - counts should be monitored closely on methimazole, which can be continued as long as her ANC remains >500  - agree with referral to endocrinology for management of hyperthyroidism and to discuss potential alternatives to methimazole, especially if neutropenia remains an issue    PLAN: labs today, restart methimazole, monitor counts    Otoniel Caldwell MD (PGY-6)  Hematology/Oncology Fellow  Will discuss with Dr. Montague (Hematology/Oncology Staff)  Distress Screening Results: Psychosocial Distress screening score of Distress Score: 0 noted and reviewed. No intervention indicated.

## 2018-11-08 LAB — COPPER SERPL-MCNC: 2288 UG/L (ref 810–1990)

## 2018-11-09 LAB — METHYLMALONATE SERPL-SCNC: 0.18 NMOL/ML

## 2018-11-20 ENCOUNTER — TELEPHONE (OUTPATIENT)
Dept: HEMATOLOGY/ONCOLOGY | Facility: CLINIC | Age: 48
End: 2018-11-20

## 2018-11-20 NOTE — TELEPHONE ENCOUNTER
I spoke with pt who stated that she went and saw a hem/onc in [place]. She stated that she is waiting on blood work and if the blood work is positive then she will need to schedule with us in order to have a hem/onc closer to where she lives. She said she will call back if she needs to make an apt. I told her we will keep her referral and if she needs to make an apt to call me and I will get her set up to come in and see Dr. Acosta.

## 2018-11-26 ENCOUNTER — TELEPHONE (OUTPATIENT)
Dept: HEMATOLOGY/ONCOLOGY | Facility: CLINIC | Age: 48
End: 2018-11-26

## 2018-11-26 NOTE — TELEPHONE ENCOUNTER
----- Message from Padmini Wilhelm sent at 11/26/2018  1:02 PM CST -----  Contact: pt  Needs Advice    Reason for call: Requesting lab results.         Communication Preference: [phone]     Additional Information:

## 2018-12-05 ENCOUNTER — OFFICE VISIT (OUTPATIENT)
Dept: FAMILY MEDICINE | Facility: CLINIC | Age: 48
End: 2018-12-05
Payer: MEDICAID

## 2018-12-05 VITALS
TEMPERATURE: 98 F | HEART RATE: 96 BPM | SYSTOLIC BLOOD PRESSURE: 138 MMHG | WEIGHT: 129 LBS | HEIGHT: 63 IN | DIASTOLIC BLOOD PRESSURE: 72 MMHG | RESPIRATION RATE: 14 BRPM | OXYGEN SATURATION: 98 % | BODY MASS INDEX: 22.86 KG/M2

## 2018-12-05 DIAGNOSIS — R73.09 ELEVATED GLUCOSE: ICD-10-CM

## 2018-12-05 DIAGNOSIS — J32.9 OTHER SINUSITIS, UNSPECIFIED CHRONICITY: ICD-10-CM

## 2018-12-05 DIAGNOSIS — E05.80 OTHER THYROTOXICOSIS WITHOUT THYROTOXIC CRISIS OR STORM: Primary | ICD-10-CM

## 2018-12-05 DIAGNOSIS — F41.9 CHRONIC ANXIETY: ICD-10-CM

## 2018-12-05 DIAGNOSIS — R00.0 SINUS TACHYCARDIA: ICD-10-CM

## 2018-12-05 DIAGNOSIS — F51.02 ADJUSTMENT INSOMNIA: ICD-10-CM

## 2018-12-05 PROCEDURE — 99214 OFFICE O/P EST MOD 30 MIN: CPT | Mod: ,,, | Performed by: INTERNAL MEDICINE

## 2018-12-05 RX ORDER — AZITHROMYCIN 250 MG/1
TABLET, FILM COATED ORAL
Qty: 6 TABLET | Refills: 0 | Status: SHIPPED | OUTPATIENT
Start: 2018-12-05 | End: 2018-12-10

## 2018-12-05 RX ORDER — ZOLPIDEM TARTRATE 10 MG/1
10 TABLET ORAL NIGHTLY PRN
Qty: 90 TABLET | Refills: 0 | Status: SHIPPED | OUTPATIENT
Start: 2018-12-05 | End: 2019-03-27 | Stop reason: SDUPTHER

## 2018-12-05 NOTE — PROGRESS NOTES
SUBJECTIVE:    Patient ID: Irish Pierce is a 48 y.o. female.    Chief Complaint: Thyroid Problem and Follow-up (lab results are under lab tab)    HPI     Patient comes in for follow-up of abnormal laboratory studies in the face of recurrent Graves' disease, for which the patient herself back on methimazole, on which she had been with her first episode. Her white count was significantly reduced with lymphocytosis and although this was felt to be possibly/probably related to methimazole, she was referred to hematology.    Issue still surrounds treatment of hyper thyroidism, and the most recent approach to treatment, at least in 70% of patients has been radioactive iodine ablation of the thyroid rather than the antithyroid agents. I see that hematology discontinue her methimazole but later recommended that she restart methimazole with regular checks on CBC.    Patient is seeing Dr Valenzuela and she is on methimazole-she will have MEDINA uptake eventually and is on methimazole until then-at this time she is not having any sx of hyper thyroidism-her only sx is hot sensation-last TSH was still very low-sleeps good with ambien-no significant tachy at night-drinking only one cup of coffee in the am    Recent sinus congestion and nasal drip without facial pain-no report of fever-no report of myalgias-no chest congestion    Lab Visit on 11/06/2018   Component Date Value Ref Range Status    WBC 11/06/2018 4.11  3.90 - 12.70 K/uL Final    RBC 11/06/2018 4.28  4.00 - 5.40 M/uL Final    Hemoglobin 11/06/2018 12.3  12.0 - 16.0 g/dL Final    Hematocrit 11/06/2018 35.4* 37.0 - 48.5 % Final    MCV 11/06/2018 83  82 - 98 fL Final    MCH 11/06/2018 28.7  27.0 - 31.0 pg Final    MCHC 11/06/2018 34.7  32.0 - 36.0 g/dL Final    RDW 11/06/2018 13.3  11.5 - 14.5 % Final    Platelets 11/06/2018 207  150 - 350 K/uL Final    MPV 11/06/2018 10.1  9.2 - 12.9 fL Final    Immature Granulocytes 11/06/2018 0.0  0.0 - 0.5 % Final    Gran #  (ANC) 11/06/2018 1.5* 1.8 - 7.7 K/uL Final    Immature Grans (Abs) 11/06/2018 0.00  0.00 - 0.04 K/uL Final    Lymph # 11/06/2018 2.1  1.0 - 4.8 K/uL Final    Mono # 11/06/2018 0.5  0.3 - 1.0 K/uL Final    Eos # 11/06/2018 0.1  0.0 - 0.5 K/uL Final    Baso # 11/06/2018 0.01  0.00 - 0.20 K/uL Final    nRBC 11/06/2018 0  0 /100 WBC Final    Gran% 11/06/2018 36.5* 38.0 - 73.0 % Final    Lymph% 11/06/2018 50.1* 18.0 - 48.0 % Final    Mono% 11/06/2018 11.7  4.0 - 15.0 % Final    Eosinophil% 11/06/2018 1.5  0.0 - 8.0 % Final    Basophil% 11/06/2018 0.2  0.0 - 1.9 % Final    Differential Method 11/06/2018 Automated   Final    Vitamin B-12 11/06/2018 320  180 - 914 ng/L Final    Folate 11/06/2018 13.5  4.0 - 24.0 ng/mL Final    Copper 11/06/2018 2288* 810 - 1990 ug/L Final    Pathologist Review 11/06/2018 Review completed   Final    LD 11/06/2018 173  110 - 260 U/L Final    Pathologist Review Peripheral Smear 11/06/2018 REVIEWED   Final    B12 Def. Methylmalonic Acid 11/06/2018 0.18  <=0.40 nmol/mL Final   Office Visit on 07/24/2018   Component Date Value Ref Range Status    Hemoglobin A1C 07/25/2018 4.7   Final   Orders Only on 07/20/2018   Component Date Value Ref Range Status    WBC 07/20/2018 4.5  3.4 - 10.8 x10E3/uL Final    RBC 07/20/2018 4.24  3.77 - 5.28 x10E6/uL Final    Hemoglobin 07/20/2018 11.7  11.1 - 15.9 g/dL Final    Hematocrit 07/20/2018 35.5  34.0 - 46.6 % Final    MCV 07/20/2018 84  79 - 97 fL Final    MCH 07/20/2018 27.6  26.6 - 33.0 pg Final    MCHC 07/20/2018 33.0  31.5 - 35.7 g/dL Final    RDW 07/20/2018 14.3  12.3 - 15.4 % Final    Platelets 07/20/2018 188  150 - 379 x10E3/uL Final    Neutrophils 07/20/2018 38  Not Estab. % Final    Lymph% 07/20/2018 43  Not Estab. % Final    Mono% 07/20/2018 15  Not Estab. % Final    Eosinophil% 07/20/2018 4  Not Estab. % Final    Basophil% 07/20/2018 0  Not Estab. % Final    Neutrophils Absolute 07/20/2018 1.7  1.4 - 7.0 x10E3/uL  Final    Lymph # 07/20/2018 1.9  0.7 - 3.1 x10E3/uL Final    Mono # 07/20/2018 0.7  0.1 - 0.9 x10E3/uL Final    Eos # 07/20/2018 0.2  0.0 - 0.4 x10E3/uL Final    Baso # 07/20/2018 0.0  0.0 - 0.2 x10E3/uL Final    Immature Granulocytes 07/20/2018 0  Not Estab. % Final    Immature Grans (Abs) 07/20/2018 0.0  0.0 - 0.1 x10E3/uL Final    Glucose 07/20/2018 141* 65 - 99 mg/dL Final    BUN, Bld 07/20/2018 8  6 - 24 mg/dL Final    Creatinine 07/20/2018 0.37* 0.57 - 1.00 mg/dL Final    eGFR if non African American 07/20/2018 129  >59 mL/min/1.73 Final    eGFR if  07/20/2018 148  >59 mL/min/1.73 Final    BUN/Creatinine Ratio 07/20/2018 22  9 - 23 Final    Sodium 07/20/2018 142  134 - 144 mmol/L Final    Potassium 07/20/2018 4.1  3.5 - 5.2 mmol/L Final    Chloride 07/20/2018 106  96 - 106 mmol/L Final    CO2 07/20/2018 22  20 - 29 mmol/L Final    Calcium 07/20/2018 9.5  8.7 - 10.2 mg/dL Final    Total Protein 07/20/2018 5.7* 6.0 - 8.5 g/dL Final    Albumin 07/20/2018 4.0  3.5 - 5.5 g/dL Final    Globulin, Total 07/20/2018 1.7  1.5 - 4.5 g/dL Final    Albumin/Globulin Ratio 07/20/2018 2.4* 1.2 - 2.2 Final    Total Bilirubin 07/20/2018 0.2  0.0 - 1.2 mg/dL Final    Alkaline Phosphatase 07/20/2018 91  39 - 117 IU/L Final    AST 07/20/2018 18  0 - 40 IU/L Final    ALT 07/20/2018 20  0 - 32 IU/L Final    Cholesterol 07/20/2018 139  100 - 199 mg/dL Final    Triglycerides 07/20/2018 65  0 - 149 mg/dL Final    HDL 07/20/2018 94  >39 mg/dL Final    VLDL Cholesterol Henri 07/20/2018 13  5 - 40 mg/dL Final    LDL Calculated 07/20/2018 32  0 - 99 mg/dL Final    TSH 07/20/2018 <0.006* 0.450 - 4.500 uIU/mL Final    Ray Russ CMP 14 Default 07/20/2018 Comment   Final    Ray Russ LP Default 07/20/2018 Comment   Final       Past Medical History:   Diagnosis Date    Abnormal thyroid ultrasound     Adjustment insomnia     Chronic anxiety     Facial eczema     Herpes simplex      "Insomnia     Intractable migraine without aura and without status migrainosus     Irritable colon     Myalgia     Paresthesia of right foot     Primary insomnia     Screening for cervical cancer      Social History     Socioeconomic History    Marital status:      Spouse name: Not on file    Number of children: Not on file    Years of education: Not on file    Highest education level: Not on file   Social Needs    Financial resource strain: Not on file    Food insecurity - worry: Not on file    Food insecurity - inability: Not on file    Transportation needs - medical: Not on file    Transportation needs - non-medical: Not on file   Occupational History    Not on file   Tobacco Use    Smoking status: Never Smoker    Smokeless tobacco: Never Used   Substance and Sexual Activity    Alcohol use: Yes    Drug use: No    Sexual activity: Not on file   Other Topics Concern    Not on file   Social History Narrative    Not on file     Past Surgical History:   Procedure Laterality Date    APPENDECTOMY       Family History   Problem Relation Age of Onset    Cancer Mother         ovarian    Hypertension Father     Cancer Father         prostate    Cancer Maternal Aunt         ovarian    Cancer Paternal Aunt          CML     Vitals:    12/05/18 1616   BP: 138/72   Pulse: 96   Resp: 14   Temp: 98.4 °F (36.9 °C)   SpO2: 98%   Weight: 58.5 kg (129 lb)   Height: 5' 3" (1.6 m)       Review of Systems   Constitutional: Negative for chills, fatigue, fever and unexpected weight change.   HENT: Negative for congestion, ear pain, hearing loss, sinus pain and sore throat.    Eyes: Negative for pain and visual disturbance.   Respiratory: Negative for cough, shortness of breath and wheezing.    Cardiovascular: Negative for chest pain, palpitations and leg swelling.   Gastrointestinal: Negative for abdominal pain, blood in stool, constipation, diarrhea, nausea and vomiting.   Endocrine: Negative for cold " intolerance and heat intolerance.   Genitourinary: Negative for difficulty urinating, dysuria, frequency, pelvic pain and urgency.   Musculoskeletal: Negative for back pain, joint swelling and neck pain.   Skin: Negative for pallor and rash.   Neurological: Negative for dizziness, tremors, weakness, numbness and headaches.   Hematological: Does not bruise/bleed easily.   Psychiatric/Behavioral: Negative for agitation, sleep disturbance and suicidal ideas.          Objective:      Physical Exam   Constitutional: She is oriented to person, place, and time. She appears well-developed and well-nourished. She is cooperative.   HENT:   Head: Normocephalic and atraumatic.   Right Ear: Tympanic membrane normal.   Left Ear: Tympanic membrane normal.   Sinus congestion   Eyes: Conjunctivae and EOM are normal. Right pupil is round and reactive. Left pupil is round and reactive.   Neck: Trachea normal and normal range of motion. Neck supple.   Cardiovascular: S1 normal, S2 normal, normal heart sounds and intact distal pulses.   Sinus tachycardia   Pulmonary/Chest: Breath sounds normal.   Abdominal: Soft. Bowel sounds are normal. There is no rigidity and no guarding.   Lymphadenopathy:     She has no cervical adenopathy.     She has no axillary adenopathy.   Neurological: She is alert and oriented to person, place, and time.   Skin: Skin is warm and dry. Capillary refill takes less than 2 seconds.   Psychiatric: She has a normal mood and affect. Her behavior is normal. Judgment and thought content normal.   Nursing note and vitals reviewed.          Assessment:       1. 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 Adjustment insomnia    3. Chronic anxiety    4. Sinus tachycardia    5. Other sinusitis, unspecified chronicity    6. Elevated glucose         Plan:           Other thyrotoxicosis without thyrotoxic crisis or storm        -     Per Dr Oliveira    Adjustment insomnia  -     zolpidem (AMBIEN) 10 mg Tab;  Take 1 tablet (10 mg total) by mouth nightly as needed.  Dispense: 90 tablet; Refill: 0    Chronic anxiety        -     Continue as is-controlled with rx    Sinus tachycardia        -     rx per Endocrine    Other sinusitis, unspecified chronicity  -     azithromycin (Z-JAEL) 250 MG tablet; Take 2 tablets by mouth on day 1; Take 1 tablet by mouth on days 2-5  Dispense: 6 tablet; Refill: 0    Elevated glucose  -     Basic metabolic panel; Future; Expected date: 12/05/2018  -     Hemoglobin A1c; Future; Expected date: 12/05/2018

## 2019-01-03 ENCOUNTER — TELEPHONE (OUTPATIENT)
Dept: FAMILY MEDICINE | Facility: CLINIC | Age: 49
End: 2019-01-03

## 2019-01-03 DIAGNOSIS — B00.9 HERPES SIMPLEX: Primary | ICD-10-CM

## 2019-01-03 RX ORDER — ACYCLOVIR 50 MG/G
OINTMENT TOPICAL
Qty: 5 G | Refills: 1 | Status: SHIPPED | OUTPATIENT
Start: 2019-01-03 | End: 2019-08-19

## 2019-01-03 NOTE — TELEPHONE ENCOUNTER
Patient left voicemessage requesting acyclovir cream in addition to the tablets that she has.  Is it safe to double dose like she is requesting?

## 2019-03-21 LAB
BUN SERPL-MCNC: 8 MG/DL (ref 7–25)
BUN/CREAT SERPL: 18 (CALC) (ref 6–22)
CALCIUM SERPL-MCNC: 10 MG/DL (ref 8.6–10.2)
CHLORIDE SERPL-SCNC: 105 MMOL/L (ref 98–110)
CO2 SERPL-SCNC: 29 MMOL/L (ref 20–32)
CREAT SERPL-MCNC: 0.44 MG/DL (ref 0.5–1.1)
GFRSERPLBLD MDRD-ARVRAT: 120 ML/MIN/1.73M2
GLUCOSE SERPL-MCNC: 83 MG/DL (ref 65–99)
HBA1C MFR BLD: 4.9 % OF TOTAL HGB
POTASSIUM SERPL-SCNC: 4.9 MMOL/L (ref 3.5–5.3)
SODIUM SERPL-SCNC: 139 MMOL/L (ref 135–146)

## 2019-03-27 ENCOUNTER — OFFICE VISIT (OUTPATIENT)
Dept: FAMILY MEDICINE | Facility: CLINIC | Age: 49
End: 2019-03-27
Payer: MEDICAID

## 2019-03-27 VITALS
BODY MASS INDEX: 23.74 KG/M2 | HEART RATE: 66 BPM | OXYGEN SATURATION: 98 % | HEIGHT: 63 IN | SYSTOLIC BLOOD PRESSURE: 128 MMHG | RESPIRATION RATE: 12 BRPM | TEMPERATURE: 99 F | DIASTOLIC BLOOD PRESSURE: 72 MMHG | WEIGHT: 134 LBS

## 2019-03-27 DIAGNOSIS — E05.00 GRAVES' DISEASE: Primary | ICD-10-CM

## 2019-03-27 DIAGNOSIS — F51.02 ADJUSTMENT INSOMNIA: ICD-10-CM

## 2019-03-27 DIAGNOSIS — Z12.39 BREAST CANCER SCREENING: ICD-10-CM

## 2019-03-27 DIAGNOSIS — F41.9 CHRONIC ANXIETY: ICD-10-CM

## 2019-03-27 PROCEDURE — 99214 PR OFFICE/OUTPT VISIT, EST, LEVL IV, 30-39 MIN: ICD-10-PCS | Mod: ,,, | Performed by: INTERNAL MEDICINE

## 2019-03-27 PROCEDURE — 99214 OFFICE O/P EST MOD 30 MIN: CPT | Mod: ,,, | Performed by: INTERNAL MEDICINE

## 2019-03-27 RX ORDER — LORAZEPAM 2 MG/1
2 TABLET ORAL DAILY PRN
Qty: 90 TABLET | Refills: 0 | Status: SHIPPED | OUTPATIENT
Start: 2019-03-27 | End: 2021-01-28

## 2019-03-27 RX ORDER — ZOLPIDEM TARTRATE 10 MG/1
10 TABLET ORAL NIGHTLY PRN
Qty: 90 TABLET | Refills: 0 | Status: SHIPPED | OUTPATIENT
Start: 2019-03-27 | End: 2019-05-06 | Stop reason: SDUPTHER

## 2019-03-27 NOTE — PROGRESS NOTES
SUBJECTIVE:    Patient ID: Irish Pirece is a 48 y.o. female.    Chief Complaint: Anxiety and Follow-up    HPI     Patient comes in for recheck. She has a history of recurrent Graves' disease, for which she put herself back on methimazole, as she had been placed on her first episode. When she had come into the clinic with a significantly reduced white count with lymphocytosis and it was felt to be due to methimazole, but she was referred to hematology. At the time of the last visit she was seeing endocrinology and she was on methimazole. Plan is to have a radioactive iodine uptake of the thyroid she was asymptomatic for hyperthyroidism in December. Final evaluation by his hematologist is at the time outstanding.    Recent laboratory studies included a glucose of 83, BUN 8 creatinine 0.44  cc/m, hemoglobin A1C 4.9, sodium 139 potassium 4.9 last white count 4.1 hemoglobin 12.3 and hematocrit 35.4 she still had a lymphocytosis with 50% lymphs. Serum copper was elevated at 2298 with an upper limit of normal 1900, but this is not been commented on by the ordering physician.-remains ups and downs post MEDINA in January.    Anxiety-chronic, probably aggravated by thyroid instability-.      Orders Only on 03/20/2019   Component Date Value Ref Range Status    Glucose 03/20/2019 83  65 - 99 mg/dL Final    BUN, Bld 03/20/2019 8  7 - 25 mg/dL Final    Creatinine 03/20/2019 0.44* 0.50 - 1.10 mg/dL Final    eGFR if non African American 03/20/2019 120  > OR = 60 mL/min/1.73m2 Final    eGFR if  03/20/2019 139  > OR = 60 mL/min/1.73m2 Final    BUN/Creatinine Ratio 03/20/2019 18  6 - 22 (calc) Final    Sodium 03/20/2019 139  135 - 146 mmol/L Final    Potassium 03/20/2019 4.9  3.5 - 5.3 mmol/L Final    Chloride 03/20/2019 105  98 - 110 mmol/L Final    CO2 03/20/2019 29  20 - 32 mmol/L Final    Calcium 03/20/2019 10.0  8.6 - 10.2 mg/dL Final    Hemoglobin A1C 03/20/2019 4.9  <5.7 % of total Hgb Final    Lab Visit on 11/06/2018   Component Date Value Ref Range Status    WBC 11/06/2018 4.11  3.90 - 12.70 K/uL Final    RBC 11/06/2018 4.28  4.00 - 5.40 M/uL Final    Hemoglobin 11/06/2018 12.3  12.0 - 16.0 g/dL Final    Hematocrit 11/06/2018 35.4* 37.0 - 48.5 % Final    MCV 11/06/2018 83  82 - 98 fL Final    MCH 11/06/2018 28.7  27.0 - 31.0 pg Final    MCHC 11/06/2018 34.7  32.0 - 36.0 g/dL Final    RDW 11/06/2018 13.3  11.5 - 14.5 % Final    Platelets 11/06/2018 207  150 - 350 K/uL Final    MPV 11/06/2018 10.1  9.2 - 12.9 fL Final    Immature Granulocytes 11/06/2018 0.0  0.0 - 0.5 % Final    Gran # (ANC) 11/06/2018 1.5* 1.8 - 7.7 K/uL Final    Immature Grans (Abs) 11/06/2018 0.00  0.00 - 0.04 K/uL Final    Lymph # 11/06/2018 2.1  1.0 - 4.8 K/uL Final    Mono # 11/06/2018 0.5  0.3 - 1.0 K/uL Final    Eos # 11/06/2018 0.1  0.0 - 0.5 K/uL Final    Baso # 11/06/2018 0.01  0.00 - 0.20 K/uL Final    nRBC 11/06/2018 0  0 /100 WBC Final    Gran% 11/06/2018 36.5* 38.0 - 73.0 % Final    Lymph% 11/06/2018 50.1* 18.0 - 48.0 % Final    Mono% 11/06/2018 11.7  4.0 - 15.0 % Final    Eosinophil% 11/06/2018 1.5  0.0 - 8.0 % Final    Basophil% 11/06/2018 0.2  0.0 - 1.9 % Final    Differential Method 11/06/2018 Automated   Final    Vitamin B-12 11/06/2018 320  180 - 914 ng/L Final    Folate 11/06/2018 13.5  4.0 - 24.0 ng/mL Final    Copper 11/06/2018 2288* 810 - 1990 ug/L Final    Pathologist Review 11/06/2018 Review completed   Final    LD 11/06/2018 173  110 - 260 U/L Final    Pathologist Review Peripheral Smear 11/06/2018 REVIEWED   Final    B12 Def. Methylmalonic Acid 11/06/2018 0.18  <=0.40 nmol/mL Final       Past Medical History:   Diagnosis Date    Abnormal thyroid ultrasound     Adjustment insomnia     Chronic anxiety     Facial eczema     Herpes simplex     Insomnia     Intractable migraine without aura and without status migrainosus     Irritable colon     Myalgia     Paresthesia of  right foot     Primary insomnia     Screening for cervical cancer      Past Surgical History:   Procedure Laterality Date    APPENDECTOMY       Family History   Problem Relation Age of Onset    Cancer Mother         ovarian    Hypertension Father     Cancer Father         prostate    Cancer Maternal Aunt         ovarian    Cancer Paternal Aunt          CML       Marital Status:   Alcohol History:  reports that she drinks alcohol.  Tobacco History:  reports that she has never smoked. She has never used smokeless tobacco.  Drug History:  reports that she does not use drugs.    Review of patient's allergies indicates:  No Known Allergies    Current Outpatient Medications:     acyclovir (ZOVIRAX) 400 MG tablet, Take 1 tablet (400 mg total) by mouth Daily., Disp: 30 tablet, Rfl: 1    acyclovir 5% (ZOVIRAX) 5 % ointment, Apply topically 5 (five) times daily., Disp: 5 g, Rfl: 1    LORazepam (ATIVAN) 2 MG Tab, Take 1 tablet (2 mg total) by mouth daily as needed., Disp: 90 tablet, Rfl: 0    propranolol (INDERAL) 10 MG tablet, Take 1 tablet (10 mg total) by mouth 3 (three) times daily., Disp: 90 tablet, Rfl: 2    sumatriptan (IMITREX) 100 MG tablet, Take 100 mg by mouth every 2 (two) hours as needed for Migraine., Disp: , Rfl:     traMADol (ULTRAM) 50 mg tablet, Take 50 mg by mouth daily as needed for Pain. , Disp: , Rfl:     TRINESSA, 28, 0.18/0.215/0.25 mg-35 mcg (28) tablet, Take 1 tablet by mouth once daily., Disp: 28 tablet, Rfl: 2    zolpidem (AMBIEN) 10 mg Tab, Take 1 tablet (10 mg total) by mouth nightly as needed., Disp: 90 tablet, Rfl: 0    Review of Systems   Constitutional: Positive for appetite change (increased hunger). Negative for chills, fatigue, fever and unexpected weight change.   HENT: Negative for congestion, ear pain, hearing loss, sinus pain and sore throat.    Eyes: Negative for pain and visual disturbance.   Respiratory: Negative for cough, shortness of breath and wheezing.   "  Cardiovascular: Negative for chest pain, palpitations and leg swelling.   Gastrointestinal: Negative for abdominal pain, blood in stool, constipation, diarrhea, nausea and vomiting.   Endocrine: Negative for cold intolerance and heat intolerance.   Genitourinary: Negative for difficulty urinating, dysuria, frequency, pelvic pain and urgency.   Musculoskeletal: Negative for back pain, joint swelling and neck pain.   Skin: Negative for pallor and rash.   Neurological: Negative for dizziness, tremors, weakness, numbness and headaches (pressure behind eyes).        Feels cloudy   Hematological: Does not bruise/bleed easily.   Psychiatric/Behavioral: Negative for agitation, sleep disturbance and suicidal ideas.          Objective:      Vitals:    03/27/19 1302   BP: 128/72   Pulse: 66   Resp: 12   Temp: 98.6 °F (37 °C)   SpO2: 98%   Weight: 60.8 kg (134 lb)   Height: 5' 3" (1.6 m)     Physical Exam   Constitutional: She is oriented to person, place, and time. She appears well-developed and well-nourished. She is cooperative.   HENT:   Head: Normocephalic and atraumatic.   Right Ear: Tympanic membrane normal.   Left Ear: Tympanic membrane normal.   Eyes: Conjunctivae, EOM and lids are normal. Right pupil is round and reactive. Left pupil is round and reactive.   Neck: Trachea normal and normal range of motion. Neck supple. No JVD present. Carotid bruit is not present. No thyromegaly present.   Cardiovascular: Normal rate, regular rhythm, S1 normal, S2 normal, normal heart sounds and intact distal pulses. Exam reveals no gallop and no friction rub.   No murmur heard.  Pulmonary/Chest: Breath sounds normal. No respiratory distress. She has no wheezes. She has no rales.   Abdominal: Soft. Bowel sounds are normal. She exhibits no mass. There is no tenderness. There is no rigidity and no guarding.   Musculoskeletal: Normal range of motion. She exhibits no edema.   Neurological: She is alert and oriented to person, place, and " time.   Skin: Skin is warm and dry. Capillary refill takes less than 2 seconds. No lesion and no rash noted. There is cyanosis. Nails show no clubbing.   Psychiatric: She has a normal mood and affect. Her behavior is normal. Judgment and thought content normal.   Nursing note and vitals reviewed.        Assessment:       1. Graves' disease    2. Chronic anxiety    3. Adjustment insomnia    4. Breast cancer screening         Plan:       Graves' disease  -     LORazepam (ATIVAN) 2 MG Tab; Take 1 tablet (2 mg total) by mouth daily as needed.  Dispense: 90 tablet; Refill: 0    Chronic anxiety        -     See above    Adjustment insomnia  -     zolpidem (AMBIEN) 10 mg Tab; Take 1 tablet (10 mg total) by mouth nightly as needed.  Dispense: 90 tablet; Refill: 0    Breast cancer screening  -     Mammo Digital Screening Bilat with Chris; Future; Expected date: 03/27/2019      No follow-ups on file.        3/28/2019 Shari Herring M.D.

## 2019-05-05 DIAGNOSIS — Z86.39 HISTORY OF THYROTOXICOSIS: ICD-10-CM

## 2019-05-05 DIAGNOSIS — E05.90 HYPERTHYROIDISM: ICD-10-CM

## 2019-05-05 DIAGNOSIS — F51.02 ADJUSTMENT INSOMNIA: ICD-10-CM

## 2019-05-06 ENCOUNTER — TELEPHONE (OUTPATIENT)
Dept: FAMILY MEDICINE | Facility: CLINIC | Age: 49
End: 2019-05-06

## 2019-05-06 DIAGNOSIS — Z00.00 ROUTINE MEDICAL EXAM: Primary | ICD-10-CM

## 2019-05-06 RX ORDER — PROPRANOLOL HYDROCHLORIDE 10 MG/1
TABLET ORAL
Qty: 90 TABLET | Refills: 0 | Status: SHIPPED | OUTPATIENT
Start: 2019-05-06 | End: 2019-05-24 | Stop reason: SDUPTHER

## 2019-05-06 RX ORDER — ZOLPIDEM TARTRATE 10 MG/1
10 TABLET ORAL NIGHTLY PRN
Qty: 90 TABLET | Refills: 0 | Status: SHIPPED | OUTPATIENT
Start: 2019-05-06 | End: 2019-08-19 | Stop reason: SDUPTHER

## 2019-05-06 NOTE — TELEPHONE ENCOUNTER
Pt states losing insurance on May 31st. Can you order any labs/tests ASAP you want her to have so she can do before her ins runs out?

## 2019-05-24 DIAGNOSIS — Z86.39 HISTORY OF THYROTOXICOSIS: ICD-10-CM

## 2019-05-24 DIAGNOSIS — E05.90 HYPERTHYROIDISM: ICD-10-CM

## 2019-05-24 RX ORDER — PROPRANOLOL HYDROCHLORIDE 10 MG/1
TABLET ORAL
Qty: 90 TABLET | Refills: 0 | Status: SHIPPED | OUTPATIENT
Start: 2019-05-24 | End: 2020-10-22

## 2019-05-30 LAB
ALBUMIN SERPL-MCNC: 4.4 G/DL (ref 3.6–5.1)
ALBUMIN/GLOB SERPL: 2.1 (CALC) (ref 1–2.5)
ALP SERPL-CCNC: 109 U/L (ref 33–115)
ALT SERPL-CCNC: 40 U/L (ref 6–29)
AST SERPL-CCNC: 39 U/L (ref 10–35)
BASOPHILS # BLD AUTO: 32 CELLS/UL (ref 0–200)
BASOPHILS NFR BLD AUTO: 0.8 %
BILIRUB SERPL-MCNC: 0.6 MG/DL (ref 0.2–1.2)
BUN SERPL-MCNC: 10 MG/DL (ref 7–25)
BUN/CREAT SERPL: ABNORMAL (CALC) (ref 6–22)
CALCIUM SERPL-MCNC: 9.5 MG/DL (ref 8.6–10.2)
CHLORIDE SERPL-SCNC: 103 MMOL/L (ref 98–110)
CHOLEST SERPL-MCNC: 219 MG/DL
CHOLEST/HDLC SERPL: 1.8 (CALC)
CO2 SERPL-SCNC: 29 MMOL/L (ref 20–32)
CREAT SERPL-MCNC: 0.56 MG/DL (ref 0.5–1.1)
EOSINOPHIL # BLD AUTO: 120 CELLS/UL (ref 15–500)
EOSINOPHIL NFR BLD AUTO: 3 %
ERYTHROCYTE [DISTWIDTH] IN BLOOD BY AUTOMATED COUNT: 14.9 % (ref 11–15)
GFRSERPLBLD MDRD-ARVRAT: 111 ML/MIN/1.73M2
GLOBULIN SER CALC-MCNC: 2.1 G/DL (CALC) (ref 1.9–3.7)
GLUCOSE SERPL-MCNC: 84 MG/DL (ref 65–99)
HCT VFR BLD AUTO: 37.2 % (ref 35–45)
HDLC SERPL-MCNC: 122 MG/DL
HGB BLD-MCNC: 13.1 G/DL (ref 11.7–15.5)
LDLC SERPL CALC-MCNC: 83 MG/DL (CALC)
LYMPHOCYTES # BLD AUTO: 1476 CELLS/UL (ref 850–3900)
LYMPHOCYTES NFR BLD AUTO: 36.9 %
MCH RBC QN AUTO: 32 PG (ref 27–33)
MCHC RBC AUTO-ENTMCNC: 35.2 G/DL (ref 32–36)
MCV RBC AUTO: 90.7 FL (ref 80–100)
MONOCYTES # BLD AUTO: 504 CELLS/UL (ref 200–950)
MONOCYTES NFR BLD AUTO: 12.6 %
NEUTROPHILS # BLD AUTO: 1868 CELLS/UL (ref 1500–7800)
NEUTROPHILS NFR BLD AUTO: 46.7 %
NONHDLC SERPL-MCNC: 97 MG/DL (CALC)
PLATELET # BLD AUTO: 239 THOUSAND/UL (ref 140–400)
PMV BLD REES-ECKER: 9.7 FL (ref 7.5–12.5)
POTASSIUM SERPL-SCNC: 4.1 MMOL/L (ref 3.5–5.3)
PROT SERPL-MCNC: 6.5 G/DL (ref 6.1–8.1)
RBC # BLD AUTO: 4.1 MILLION/UL (ref 3.8–5.1)
SODIUM SERPL-SCNC: 138 MMOL/L (ref 135–146)
T3FREE SERPL-MCNC: 2.7 PG/ML (ref 2.3–4.2)
T4 FREE SERPL-MCNC: 1.4 NG/DL (ref 0.8–1.8)
TRIGL SERPL-MCNC: 65 MG/DL
TSH SERPL-ACNC: 0.94 MIU/L
WBC # BLD AUTO: 4 THOUSAND/UL (ref 3.8–10.8)

## 2019-06-03 ENCOUNTER — TELEPHONE (OUTPATIENT)
Dept: FAMILY MEDICINE | Facility: CLINIC | Age: 49
End: 2019-06-03

## 2019-06-03 NOTE — TELEPHONE ENCOUNTER
----- Message from Shari Herring MD sent at 5/30/2019  4:31 PM CDT -----  Please call the patient regarding her abnormal result.-minimally elevated cholesterol-nothing to change treatments

## 2019-07-23 DIAGNOSIS — Z86.19 HISTORY OF HERPES LABIALIS: ICD-10-CM

## 2019-07-23 RX ORDER — ACYCLOVIR 400 MG/1
TABLET ORAL
Qty: 30 TABLET | Refills: 0 | Status: SHIPPED | OUTPATIENT
Start: 2019-07-23 | End: 2021-01-28 | Stop reason: SDUPTHER

## 2019-08-19 ENCOUNTER — OFFICE VISIT (OUTPATIENT)
Dept: FAMILY MEDICINE | Facility: CLINIC | Age: 49
End: 2019-08-19

## 2019-08-19 VITALS
OXYGEN SATURATION: 98 % | WEIGHT: 128 LBS | RESPIRATION RATE: 14 BRPM | SYSTOLIC BLOOD PRESSURE: 132 MMHG | DIASTOLIC BLOOD PRESSURE: 84 MMHG | BODY MASS INDEX: 22.68 KG/M2 | HEART RATE: 74 BPM | HEIGHT: 63 IN | TEMPERATURE: 99 F

## 2019-08-19 DIAGNOSIS — Z30.41 FAMILY PLANNING, BCP (BIRTH CONTROL PILLS) MAINTENANCE: ICD-10-CM

## 2019-08-19 DIAGNOSIS — Z87.39 H/O BURNING PAIN IN LEG: Primary | ICD-10-CM

## 2019-08-19 DIAGNOSIS — Z12.39 SCREENING BREAST EXAMINATION: ICD-10-CM

## 2019-08-19 DIAGNOSIS — G62.9 NEUROPATHY: ICD-10-CM

## 2019-08-19 DIAGNOSIS — F51.02 ADJUSTMENT INSOMNIA: ICD-10-CM

## 2019-08-19 PROCEDURE — 99214 OFFICE O/P EST MOD 30 MIN: CPT | Mod: PBBFAC | Performed by: INTERNAL MEDICINE

## 2019-08-19 PROCEDURE — 99999 PR PBB SHADOW E&M-EST. PATIENT-LVL IV: CPT | Mod: PBBFAC,,, | Performed by: INTERNAL MEDICINE

## 2019-08-19 PROCEDURE — 99999 PR PBB SHADOW E&M-EST. PATIENT-LVL IV: ICD-10-PCS | Mod: PBBFAC,,, | Performed by: INTERNAL MEDICINE

## 2019-08-19 PROCEDURE — 99214 OFFICE O/P EST MOD 30 MIN: CPT | Mod: S$PBB,,, | Performed by: INTERNAL MEDICINE

## 2019-08-19 PROCEDURE — 99214 PR OFFICE/OUTPT VISIT, EST, LEVL IV, 30-39 MIN: ICD-10-PCS | Mod: S$PBB,,, | Performed by: INTERNAL MEDICINE

## 2019-08-19 RX ORDER — CYCLOBENZAPRINE HCL 10 MG
10 TABLET ORAL NIGHTLY
Qty: 10 TABLET | Refills: 1 | Status: SHIPPED | OUTPATIENT
Start: 2019-08-19 | End: 2019-08-29

## 2019-08-19 RX ORDER — GABAPENTIN 300 MG/1
300 CAPSULE ORAL 3 TIMES DAILY
Qty: 30 CAPSULE | Refills: 2 | Status: SHIPPED | OUTPATIENT
Start: 2019-08-19 | End: 2021-01-28

## 2019-08-19 RX ORDER — ZOLPIDEM TARTRATE 10 MG/1
10 TABLET ORAL NIGHTLY PRN
Qty: 90 TABLET | Refills: 0 | Status: SHIPPED | OUTPATIENT
Start: 2019-08-19 | End: 2020-10-15

## 2019-08-19 NOTE — PROGRESS NOTES
SUBJECTIVE:    Patient ID: Irish Pierce is a 48 y.o. female.    Chief Complaint: Pain (hot area on back of thigh)    HPI     Patient comes in complaining of a burning sensation on the posterior left thigh-she remembers dropping a bottle of tobasco sauce on one of the feet-several days later she developed a very intense pain on the dorsum of the lef tfoot, and then she began to have this burning pain on the posterior left thigh and she became frightened it may be a [id number] also complained of some bilateral hip pain and some pain in the lateral lower back posteriorally.When asked re further activity she remembers starting at the gym doing leg lifts, squats, and this was about a week prior to the onset of the pain-     Pt is post ablation for thyrotoxicosis and has not seen Endo for a while due to insurance issues-she s on a dose of thyroid rx that has not been changed nor have there been any thyroid checks.    Orders Only on 05/29/2019   Component Date Value Ref Range Status    Cholesterol 05/29/2019 219* <200 mg/dL Final    HDL 05/29/2019 122  >50 mg/dL Final    Triglycerides 05/29/2019 65  <150 mg/dL Final    LDL Cholesterol 05/29/2019 83  mg/dL (calc) Final    Hdl/Cholesterol Ratio 05/29/2019 1.8  <5.0 (calc) Final    Non HDL Chol. (LDL+VLDL) 05/29/2019 97  <130 mg/dL (calc) Final    Glucose 05/29/2019 84  65 - 99 mg/dL Final    BUN, Bld 05/29/2019 10  7 - 25 mg/dL Final    Creatinine 05/29/2019 0.56  0.50 - 1.10 mg/dL Final    eGFR if non African American 05/29/2019 111  > OR = 60 mL/min/1.73m2 Final    eGFR if African American 05/29/2019 129  > OR = 60 mL/min/1.73m2 Final    BUN/Creatinine Ratio 05/29/2019 NOT APPLICABLE  6 - 22 (calc) Final    Sodium 05/29/2019 138  135 - 146 mmol/L Final    Potassium 05/29/2019 4.1  3.5 - 5.3 mmol/L Final    Chloride 05/29/2019 103  98 - 110 mmol/L Final    CO2 05/29/2019 29  20 - 32 mmol/L Final    Calcium 05/29/2019 9.5  8.6 - 10.2 mg/dL Final     Total Protein 05/29/2019 6.5  6.1 - 8.1 g/dL Final    Albumin 05/29/2019 4.4  3.6 - 5.1 g/dL Final    Globulin, Total 05/29/2019 2.1  1.9 - 3.7 g/dL (calc) Final    Albumin/Globulin Ratio 05/29/2019 2.1  1.0 - 2.5 (calc) Final    Total Bilirubin 05/29/2019 0.6  0.2 - 1.2 mg/dL Final    Alkaline Phosphatase 05/29/2019 109  33 - 115 U/L Final    AST 05/29/2019 39* 10 - 35 U/L Final    ALT 05/29/2019 40* 6 - 29 U/L Final    WBC 05/29/2019 4.0  3.8 - 10.8 Thousand/uL Final    RBC 05/29/2019 4.10  3.80 - 5.10 Million/uL Final    Hemoglobin 05/29/2019 13.1  11.7 - 15.5 g/dL Final    Hematocrit 05/29/2019 37.2  35.0 - 45.0 % Final    Mean Corpuscular Volume 05/29/2019 90.7  80.0 - 100.0 fL Final    Mean Corpuscular Hemoglobin 05/29/2019 32.0  27.0 - 33.0 pg Final    Mean Corpuscular Hemoglobin Conc 05/29/2019 35.2  32.0 - 36.0 g/dL Final    RDW 05/29/2019 14.9  11.0 - 15.0 % Final    Platelets 05/29/2019 239  140 - 400 Thousand/uL Final    MPV 05/29/2019 9.7  7.5 - 12.5 fL Final    Neutrophils Absolute 05/29/2019 1,868  1,500 - 7,800 cells/uL Final    Lymph # 05/29/2019 1,476  850 - 3,900 cells/uL Final    Mono # 05/29/2019 504  200 - 950 cells/uL Final    Eos # 05/29/2019 120  15 - 500 cells/uL Final    Baso # 05/29/2019 32  0 - 200 cells/uL Final    Neutrophils Relative 05/29/2019 46.7  % Final    Lymph% 05/29/2019 36.9  % Final    Mono% 05/29/2019 12.6  % Final    Eosinophil% 05/29/2019 3.0  % Final    Basophil% 05/29/2019 0.8  % Final    T4, Free 05/29/2019 1.4  0.8 - 1.8 ng/dL Final    TSH 05/29/2019 0.94  mIU/L Final    T3, Free 05/29/2019 2.7  2.3 - 4.2 pg/mL Final   Orders Only on 03/20/2019   Component Date Value Ref Range Status    Glucose 03/20/2019 83  65 - 99 mg/dL Final    BUN, Bld 03/20/2019 8  7 - 25 mg/dL Final    Creatinine 03/20/2019 0.44* 0.50 - 1.10 mg/dL Final    eGFR if non African American 03/20/2019 120  > OR = 60 mL/min/1.73m2 Final    eGFR if   03/20/2019 139  > OR = 60 mL/min/1.73m2 Final    BUN/Creatinine Ratio 03/20/2019 18  6 - 22 (calc) Final    Sodium 03/20/2019 139  135 - 146 mmol/L Final    Potassium 03/20/2019 4.9  3.5 - 5.3 mmol/L Final    Chloride 03/20/2019 105  98 - 110 mmol/L Final    CO2 03/20/2019 29  20 - 32 mmol/L Final    Calcium 03/20/2019 10.0  8.6 - 10.2 mg/dL Final    Hemoglobin A1C 03/20/2019 4.9  <5.7 % of total Hgb Final       Past Medical History:   Diagnosis Date    Abnormal thyroid ultrasound     Adjustment insomnia     Chronic anxiety     Facial eczema     Herpes simplex     Insomnia     Intractable migraine without aura and without status migrainosus     Irritable colon     Myalgia     Paresthesia of right foot     Primary insomnia     Screening for cervical cancer      Past Surgical History:   Procedure Laterality Date    APPENDECTOMY       Family History   Problem Relation Age of Onset    Cancer Mother         ovarian    Hypertension Father     Cancer Father         prostate    Cancer Maternal Aunt         ovarian    Cancer Paternal Aunt          CML       Marital Status:   Alcohol History:  reports that she drinks alcohol.  Tobacco History:  reports that she has never smoked. She has never used smokeless tobacco.  Drug History:  reports that she does not use drugs.    Review of patient's allergies indicates:  No Known Allergies    Current Outpatient Medications:     acyclovir (ZOVIRAX) 400 MG tablet, TAKE 1 TABLET(400 MG) BY MOUTH DAILY, Disp: 30 tablet, Rfl: 0    LORazepam (ATIVAN) 2 MG Tab, Take 1 tablet (2 mg total) by mouth daily as needed., Disp: 90 tablet, Rfl: 0    propranolol (INDERAL) 10 MG tablet, TAKE 1 TABLET(10 MG) BY MOUTH THREE TIMES DAILY, Disp: 90 tablet, Rfl: 0    sumatriptan (IMITREX) 100 MG tablet, Take 100 mg by mouth every 2 (two) hours as needed for Migraine., Disp: , Rfl:     traMADol (ULTRAM) 50 mg tablet, Take 50 mg by mouth daily as needed for Pain. , Disp: ,  "Rfl:     TRINESSA, 28, 0.18/0.215/0.25 mg-35 mcg (28) tablet, Take 1 tablet by mouth once daily., Disp: 28 tablet, Rfl: 2    zolpidem (AMBIEN) 10 mg Tab, Take 1 tablet (10 mg total) by mouth nightly as needed., Disp: 90 tablet, Rfl: 0    cyclobenzaprine (FLEXERIL) 10 MG tablet, Take 1 tablet (10 mg total) by mouth every evening. for 10 days, Disp: 10 tablet, Rfl: 1    gabapentin (NEURONTIN) 300 MG capsule, Take 1 capsule (300 mg total) by mouth 3 (three) times daily., Disp: 30 capsule, Rfl: 2    Review of Systems   All other systems reviewed and are negative.         Objective:      Vitals:    08/19/19 1530   BP: 132/84   Pulse: 74   Resp: 14   Temp: 98.6 °F (37 °C)   SpO2: 98%   Weight: 58.1 kg (128 lb)   Height: 5' 3" (1.6 m)     Physical Exam   Constitutional: She is oriented to person, place, and time. She appears well-developed and well-nourished. She is cooperative. No distress.   HENT:   Head: Normocephalic and atraumatic.   Right Ear: Tympanic membrane normal.   Left Ear: Tympanic membrane normal.   Mouth/Throat: Uvula is midline and mucous membranes are normal.   Eyes: Pupils are equal, round, and reactive to light. Conjunctivae and EOM are normal. Right eye exhibits no discharge. Left eye exhibits no discharge. No scleral icterus. Right pupil is round and reactive. Left pupil is round and reactive.   Neck: Trachea normal and normal range of motion. Neck supple. No JVD present. Carotid bruit is not present. No thyromegaly present.   Cardiovascular: Normal rate and regular rhythm. Exam reveals no gallop and no friction rub.   No murmur heard.  Pulmonary/Chest: Effort normal and breath sounds normal. No respiratory distress. She has no wheezes. She has no rales.   Abdominal: Soft. Bowel sounds are normal. She exhibits no distension and no mass. There is no tenderness. There is no guarding. No hernia.   Musculoskeletal: Normal range of motion. She exhibits no edema.   negative SLR--equal DTR's--no lesions " seen on the posterior left thigh and the pain is more superficial--she has no tenderness of the left dorsal foot   Neurological: She is alert and oriented to person, place, and time. She has normal strength.   Skin: Skin is warm and dry. Capillary refill takes less than 2 seconds. No lesion and no rash noted. No cyanosis. Nails show no clubbing.   Psychiatric: She has a normal mood and affect. Her speech is normal and behavior is normal. Judgment and thought content normal.   Nursing note and vitals reviewed.        Assessment:       1. H/O burning pain in leg    2. Neuropathy    3. Adjustment insomnia    4. Screening breast examination    5. Family planning, BCP (birth control pills) maintenance         Plan:       H/O burning pain in leg  -     gabapentin (NEURONTIN) 300 MG capsule; Take 1 capsule (300 mg total) by mouth 3 (three) times daily.  Dispense: 30 capsule; Refill: 2  -     cyclobenzaprine (FLEXERIL) 10 MG tablet; Take 1 tablet (10 mg total) by mouth every evening. for 10 days  Dispense: 10 tablet; Refill: 1    Neuropathy    Adjustment insomnia  -     zolpidem (AMBIEN) 10 mg Tab; Take 1 tablet (10 mg total) by mouth nightly as needed.  Dispense: 90 tablet; Refill: 0    Screening breast examination  -     Mammo Digital Screening Bilat with Chris; Future; Expected date: 08/19/2019    Family planning, BCP (birth control pills) maintenance  -     TRINESSA, 28, 0.18/0.215/0.25 mg-35 mcg (28) tablet; Take 1 tablet by mouth once daily.  Dispense: 28 tablet; Refill: 2      No follow-ups on file.        8/19/2019 Shari Herring M.D.

## 2019-12-04 DIAGNOSIS — F51.02 ADJUSTMENT INSOMNIA: ICD-10-CM

## 2019-12-04 RX ORDER — ZOLPIDEM TARTRATE 10 MG/1
TABLET ORAL
Qty: 30 TABLET | Refills: 0 | Status: SHIPPED | OUTPATIENT
Start: 2019-12-04 | End: 2020-10-15

## 2020-10-15 DIAGNOSIS — F51.02 ADJUSTMENT INSOMNIA: ICD-10-CM

## 2020-10-15 RX ORDER — ZOLPIDEM TARTRATE 10 MG/1
10 TABLET ORAL NIGHTLY
Qty: 30 TABLET | Refills: 0 | Status: SHIPPED | OUTPATIENT
Start: 2020-10-15 | End: 2020-10-22 | Stop reason: SDUPTHER

## 2020-10-22 ENCOUNTER — OFFICE VISIT (OUTPATIENT)
Dept: FAMILY MEDICINE | Facility: CLINIC | Age: 50
End: 2020-10-22
Payer: MEDICAID

## 2020-10-22 DIAGNOSIS — Z12.31 ENCOUNTER FOR SCREENING MAMMOGRAM FOR BREAST CANCER: ICD-10-CM

## 2020-10-22 DIAGNOSIS — F51.02 ADJUSTMENT INSOMNIA: ICD-10-CM

## 2020-10-22 DIAGNOSIS — S69.90XA FINGER INJURY, INITIAL ENCOUNTER: ICD-10-CM

## 2020-10-22 DIAGNOSIS — Z00.00 ROUTINE GENERAL MEDICAL EXAMINATION AT A HEALTH CARE FACILITY: Primary | ICD-10-CM

## 2020-10-22 PROCEDURE — 99213 PR OFFICE/OUTPT VISIT, EST, LEVL III, 20-29 MIN: ICD-10-PCS | Mod: 95,,, | Performed by: INTERNAL MEDICINE

## 2020-10-22 PROCEDURE — 99213 OFFICE O/P EST LOW 20 MIN: CPT | Mod: 95,,, | Performed by: INTERNAL MEDICINE

## 2020-10-22 RX ORDER — LEVOTHYROXINE SODIUM 112 UG/1
1 TABLET ORAL DAILY
COMMUNITY
Start: 2020-10-15

## 2020-10-22 RX ORDER — ZOLPIDEM TARTRATE 10 MG/1
10 TABLET ORAL NIGHTLY
Qty: 90 TABLET | Refills: 0 | Status: SHIPPED | OUTPATIENT
Start: 2020-10-22 | End: 2021-01-28 | Stop reason: SDUPTHER

## 2020-10-22 NOTE — PROGRESS NOTES
Subjective:        The chief complaint leading to consultation is: med refills  The patient location is:  Home  Visit type: Virtual visit with synchronous audio/video or audio only  This was a video visit in lieu of in-person visit due to the coronavirus emergency. Patient acknowledged and consented to the video visit encounter.     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    Thyroid is being adjusted by another MD-    No visits with results within 6 Month(s) from this visit.   Latest known visit with results is:   Orders Only on 05/29/2019   Component Date Value Ref Range Status    Cholesterol 05/29/2019 219* <200 mg/dL Final    HDL 05/29/2019 122  >50 mg/dL Final    Triglycerides 05/29/2019 65  <150 mg/dL Final    LDL Cholesterol 05/29/2019 83  mg/dL (calc) Final    Hdl/Cholesterol Ratio 05/29/2019 1.8  <5.0 (calc) Final    Non HDL Chol. (LDL+VLDL) 05/29/2019 97  <130 mg/dL (calc) Final    Glucose 05/29/2019 84  65 - 99 mg/dL Final    BUN, Bld 05/29/2019 10  7 - 25 mg/dL Final    Creatinine 05/29/2019 0.56  0.50 - 1.10 mg/dL Final    eGFR if non African American 05/29/2019 111  > OR = 60 mL/min/1.73m2 Final    eGFR if African American 05/29/2019 129  > OR = 60 mL/min/1.73m2 Final    BUN/Creatinine Ratio 05/29/2019 NOT APPLICABLE  6 - 22 (calc) Final    Sodium 05/29/2019 138  135 - 146 mmol/L Final    Potassium 05/29/2019 4.1  3.5 - 5.3 mmol/L Final    Chloride 05/29/2019 103  98 - 110 mmol/L Final    CO2 05/29/2019 29  20 - 32 mmol/L Final    Calcium 05/29/2019 9.5  8.6 - 10.2 mg/dL Final    Total Protein 05/29/2019 6.5  6.1 - 8.1 g/dL Final    Albumin 05/29/2019 4.4  3.6 - 5.1 g/dL Final    Globulin, Total 05/29/2019 2.1  1.9 - 3.7 g/dL (calc) Final    Albumin/Globulin Ratio 05/29/2019  2.1  1.0 - 2.5 (calc) Final    Total Bilirubin 05/29/2019 0.6  0.2 - 1.2 mg/dL Final    Alkaline Phosphatase 05/29/2019 109  33 - 115 U/L Final    AST 05/29/2019 39* 10 - 35 U/L Final    ALT 05/29/2019 40* 6 - 29 U/L Final    WBC 05/29/2019 4.0  3.8 - 10.8 Thousand/uL Final    RBC 05/29/2019 4.10  3.80 - 5.10 Million/uL Final    Hemoglobin 05/29/2019 13.1  11.7 - 15.5 g/dL Final    Hematocrit 05/29/2019 37.2  35.0 - 45.0 % Final    Mean Corpuscular Volume 05/29/2019 90.7  80.0 - 100.0 fL Final    Mean Corpuscular Hemoglobin 05/29/2019 32.0  27.0 - 33.0 pg Final    Mean Corpuscular Hemoglobin Conc 05/29/2019 35.2  32.0 - 36.0 g/dL Final    RDW 05/29/2019 14.9  11.0 - 15.0 % Final    Platelets 05/29/2019 239  140 - 400 Thousand/uL Final    MPV 05/29/2019 9.7  7.5 - 12.5 fL Final    Neutrophils Absolute 05/29/2019 1,868  1,500 - 7,800 cells/uL Final    Lymph # 05/29/2019 1,476  850 - 3,900 cells/uL Final    Mono # 05/29/2019 504  200 - 950 cells/uL Final    Eos # 05/29/2019 120  15 - 500 cells/uL Final    Baso # 05/29/2019 32  0 - 200 cells/uL Final    Neutrophils Relative 05/29/2019 46.7  % Final    Lymph% 05/29/2019 36.9  % Final    Mono% 05/29/2019 12.6  % Final    Eosinophil% 05/29/2019 3.0  % Final    Basophil% 05/29/2019 0.8  % Final    T4, Free 05/29/2019 1.4  0.8 - 1.8 ng/dL Final    TSH 05/29/2019 0.94  mIU/L Final    T3, Free 05/29/2019 2.7  2.3 - 4.2 pg/mL Final       Past Surgical History:   Procedure Laterality Date    APPENDECTOMY       Past Medical History:   Diagnosis Date    Abnormal thyroid ultrasound     Adjustment insomnia     Chronic anxiety     Facial eczema     Herpes simplex     Insomnia     Intractable migraine without aura and without status migrainosus     Irritable colon     Myalgia     Paresthesia of right foot     Primary insomnia     Screening for cervical cancer      Family History   Problem Relation Age of Onset    Cancer Mother         ovarian     Hypertension Father     Cancer Father         prostate    Cancer Maternal Aunt         ovarian    Cancer Paternal Aunt          CML        Social History:   Marital Status:   Alcohol History:  reports current alcohol use.  Tobacco History:  reports that she has never smoked. She has never used smokeless tobacco.  Drug History:  reports no history of drug use.    Review of patient's allergies indicates:   Allergen Reactions    Cilantro Hives       Current Outpatient Medications   Medication Sig Dispense Refill    acyclovir (ZOVIRAX) 400 MG tablet TAKE 1 TABLET(400 MG) BY MOUTH DAILY 30 tablet 0    gabapentin (NEURONTIN) 300 MG capsule Take 1 capsule (300 mg total) by mouth 3 (three) times daily. 30 capsule 2    LORazepam (ATIVAN) 2 MG Tab Take 1 tablet (2 mg total) by mouth daily as needed. 90 tablet 0    traMADol (ULTRAM) 50 mg tablet Take 50 mg by mouth daily as needed for Pain.       TRINESSA, 28, 0.18/0.215/0.25 mg-35 mcg (28) tablet Take 1 tablet by mouth once daily. 28 tablet 2    UNITHROID 112 mcg tablet Take 1 tablet by mouth once daily.      zolpidem (AMBIEN) 10 mg Tab Take 1 tablet (10 mg total) by mouth nightly. 90 tablet 0     No current facility-administered medications for this visit.        Review of Systems   Constitutional: Positive for activity change. Negative for unexpected weight change.   HENT: Negative for hearing loss, rhinorrhea and trouble swallowing.    Eyes: Negative for discharge and visual disturbance.   Respiratory: Negative for chest tightness and wheezing.    Cardiovascular: Negative for chest pain and palpitations.   Gastrointestinal: Negative for blood in stool, constipation, diarrhea and vomiting.   Endocrine: Negative for polydipsia and polyuria.   Genitourinary: Negative for difficulty urinating, dysuria, hematuria and menstrual problem.   Musculoskeletal: Positive for arthralgias and joint swelling (left index finger PIP joint swelling-cant bend it still).  Negative for neck pain.   Neurological: Negative for weakness and headaches.   Psychiatric/Behavioral: Negative for confusion and dysphoric mood.         Objective:          Physical Exam         Assessment:       1. Routine general medical examination at a health care facility    2. Encounter for screening mammogram for breast cancer    3. Finger injury, initial encounter    4. Adjustment insomnia      Plan:   Routine general medical examination at a health care facility  -     CBC auto differential; Future; Expected date: 10/23/2020  -     Comprehensive Metabolic Panel; Future; Expected date: 10/23/2020  -     Lipid Panel; Future; Expected date: 10/23/2020  -     Urinalysis Microscopic; Future; Expected date: 10/23/2020  -     Hepatitis C Antibody; Future; Expected date: 10/23/2020    Encounter for screening mammogram for breast cancer  -     Mammo Digital Screening Bilat w/ Chris; Future; Expected date: 10/22/2020    Finger injury, initial encounter  -     Ambulatory referral/consult to Orthopedics; Future; Expected date: 10/29/2020    Adjustment insomnia  -     zolpidem (AMBIEN) 10 mg Tab; Take 1 tablet (10 mg total) by mouth nightly.  Dispense: 90 tablet; Refill: 0       follow up in 3 months    Total time spent with patient: 8 minutes    Each patient to whom he or she provides medical services by telemedicine is:  (1) informed of the relationship between the physician and patient and the respective role of any other health care provider with respect to management of the patient; and (2) notified that he or she may decline to receive medical services by telemedicine and may withdraw from such care at any time.    This note was created using [employer] voice recognition software that occasionally misinterprets phrases or words.

## 2020-11-03 ENCOUNTER — TELEPHONE (OUTPATIENT)
Dept: FAMILY MEDICINE | Facility: CLINIC | Age: 50
End: 2020-11-03

## 2020-11-03 NOTE — TELEPHONE ENCOUNTER
----- Message from Mena Mcnally sent at 10/30/2020  4:18 PM CDT -----  Regarding: Screening Mammogram  We have made 4 attempts to contact this pt to  schedule their screening mammogram and have been unable to reach them therefore we are removing this from our work queue. We just wanted to make you aware of this in case you wanted to reach out to the patient.     Thanks,   [facility] Centralized Scheduling

## 2020-12-11 ENCOUNTER — HOSPITAL ENCOUNTER (OUTPATIENT)
Dept: RADIOLOGY | Facility: HOSPITAL | Age: 50
Discharge: HOME | End: 2020-12-11
Attending: INTERNAL MEDICINE
Payer: MEDICAID

## 2020-12-11 DIAGNOSIS — Z12.31 ENCOUNTER FOR SCREENING MAMMOGRAM FOR BREAST CANCER: ICD-10-CM

## 2020-12-11 DIAGNOSIS — R92.8 ABNORMAL MAMMOGRAM OF LEFT BREAST: Primary | ICD-10-CM

## 2020-12-11 PROCEDURE — 77067 SCR MAMMO BI INCL CAD: CPT | Mod: TC,PO

## 2020-12-30 ENCOUNTER — HOSPITAL ENCOUNTER (OUTPATIENT)
Dept: RADIOLOGY | Facility: HOSPITAL | Age: 50
Discharge: HOME | End: 2020-12-30
Attending: INTERNAL MEDICINE
Payer: MEDICAID

## 2020-12-30 DIAGNOSIS — R92.8 ABNORMAL MAMMOGRAM OF LEFT BREAST: ICD-10-CM

## 2020-12-30 PROCEDURE — 76642 ULTRASOUND BREAST LIMITED: CPT | Mod: TC,PO,LT

## 2020-12-30 PROCEDURE — 77066 DX MAMMO INCL CAD BI: CPT | Mod: TC,PO

## 2021-01-28 ENCOUNTER — OFFICE VISIT (OUTPATIENT)
Dept: FAMILY MEDICINE | Facility: CLINIC | Age: 51
End: 2021-01-28
Payer: MEDICAID

## 2021-01-28 VITALS
HEIGHT: 63 IN | OXYGEN SATURATION: 99 % | BODY MASS INDEX: 24.45 KG/M2 | RESPIRATION RATE: 16 BRPM | HEART RATE: 98 BPM | TEMPERATURE: 98 F | WEIGHT: 138 LBS

## 2021-01-28 DIAGNOSIS — Z86.19 HISTORY OF HERPES LABIALIS: ICD-10-CM

## 2021-01-28 DIAGNOSIS — Z11.59 NEED FOR HEPATITIS C SCREENING TEST: ICD-10-CM

## 2021-01-28 DIAGNOSIS — Z30.41 FAMILY PLANNING, BCP (BIRTH CONTROL PILLS) MAINTENANCE: ICD-10-CM

## 2021-01-28 DIAGNOSIS — Z11.4 ENCOUNTER FOR SCREENING FOR HIV: ICD-10-CM

## 2021-01-28 DIAGNOSIS — F51.02 ADJUSTMENT INSOMNIA: Primary | ICD-10-CM

## 2021-01-28 PROCEDURE — 99214 OFFICE O/P EST MOD 30 MIN: CPT | Performed by: INTERNAL MEDICINE

## 2021-01-28 PROCEDURE — 99213 OFFICE O/P EST LOW 20 MIN: CPT | Mod: S$PBB,,, | Performed by: INTERNAL MEDICINE

## 2021-01-28 PROCEDURE — 99213 PR OFFICE/OUTPT VISIT, EST, LEVL III, 20-29 MIN: ICD-10-PCS | Mod: S$PBB,,, | Performed by: INTERNAL MEDICINE

## 2021-01-28 RX ORDER — LEVOTHYROXINE SODIUM 112 UG/1
112 TABLET ORAL EVERY OTHER DAY
COMMUNITY
Start: 2021-01-06

## 2021-01-28 RX ORDER — ACYCLOVIR 400 MG/1
TABLET ORAL
Qty: 30 TABLET | Refills: 0 | Status: SHIPPED | OUTPATIENT
Start: 2021-01-28 | End: 2021-06-16 | Stop reason: SDUPTHER

## 2021-01-28 RX ORDER — ZOLPIDEM TARTRATE 10 MG/1
10 TABLET ORAL NIGHTLY
Qty: 90 TABLET | Refills: 1 | Status: SHIPPED | OUTPATIENT
Start: 2021-01-28 | End: 2021-06-16 | Stop reason: SDUPTHER

## 2021-05-12 ENCOUNTER — PATIENT MESSAGE (OUTPATIENT)
Dept: RESEARCH | Facility: HOSPITAL | Age: 51
End: 2021-05-12

## 2021-06-16 ENCOUNTER — OFFICE VISIT (OUTPATIENT)
Dept: FAMILY MEDICINE | Facility: CLINIC | Age: 51
End: 2021-06-16
Payer: MEDICAID

## 2021-06-16 VITALS
OXYGEN SATURATION: 98 % | DIASTOLIC BLOOD PRESSURE: 80 MMHG | TEMPERATURE: 99 F | HEIGHT: 63 IN | RESPIRATION RATE: 16 BRPM | BODY MASS INDEX: 24.8 KG/M2 | WEIGHT: 140 LBS | SYSTOLIC BLOOD PRESSURE: 132 MMHG | HEART RATE: 80 BPM

## 2021-06-16 DIAGNOSIS — F51.02 ADJUSTMENT INSOMNIA: ICD-10-CM

## 2021-06-16 DIAGNOSIS — Z86.19 HISTORY OF HERPES LABIALIS: ICD-10-CM

## 2021-06-16 DIAGNOSIS — I10 BENIGN HYPERTENSION: ICD-10-CM

## 2021-06-16 DIAGNOSIS — Z30.41 FAMILY PLANNING, BCP (BIRTH CONTROL PILLS) MAINTENANCE: ICD-10-CM

## 2021-06-16 DIAGNOSIS — R73.09 ELEVATED GLUCOSE: ICD-10-CM

## 2021-06-16 DIAGNOSIS — E78.5 HYPERLIPIDEMIA, UNSPECIFIED HYPERLIPIDEMIA TYPE: ICD-10-CM

## 2021-06-16 DIAGNOSIS — J01.40 ACUTE NON-RECURRENT PANSINUSITIS: Primary | ICD-10-CM

## 2021-06-16 DIAGNOSIS — Z12.4 CERVICAL CANCER SCREENING: ICD-10-CM

## 2021-06-16 DIAGNOSIS — Z00.00 ROUTINE GENERAL MEDICAL EXAMINATION AT A HEALTH CARE FACILITY: ICD-10-CM

## 2021-06-16 DIAGNOSIS — J40 BRONCHITIS: ICD-10-CM

## 2021-06-16 DIAGNOSIS — E03.9 ACQUIRED HYPOTHYROIDISM: ICD-10-CM

## 2021-06-16 PROCEDURE — 99214 PR OFFICE/OUTPT VISIT, EST, LEVL IV, 30-39 MIN: ICD-10-PCS | Mod: S$PBB,,, | Performed by: INTERNAL MEDICINE

## 2021-06-16 PROCEDURE — 99215 OFFICE O/P EST HI 40 MIN: CPT | Performed by: INTERNAL MEDICINE

## 2021-06-16 PROCEDURE — 96372 THER/PROPH/DIAG INJ SC/IM: CPT | Mod: PBBFAC | Performed by: INTERNAL MEDICINE

## 2021-06-16 PROCEDURE — 99214 OFFICE O/P EST MOD 30 MIN: CPT | Mod: S$PBB,,, | Performed by: INTERNAL MEDICINE

## 2021-06-16 PROCEDURE — 94640 AIRWAY INHALATION TREATMENT: CPT | Mod: PBBFAC | Performed by: INTERNAL MEDICINE

## 2021-06-16 RX ORDER — ZOLPIDEM TARTRATE 10 MG/1
10 TABLET ORAL NIGHTLY
Qty: 90 TABLET | Refills: 0 | Status: SHIPPED | OUTPATIENT
Start: 2021-06-16 | End: 2021-07-14 | Stop reason: SDUPTHER

## 2021-06-16 RX ORDER — ACYCLOVIR 400 MG/1
TABLET ORAL
Qty: 30 TABLET | Refills: 0 | Status: SHIPPED | OUTPATIENT
Start: 2021-06-16

## 2021-06-16 RX ORDER — HYDROCODONE POLISTIREX AND CHLORPHENIRAMINE POLISTIREX 10; 8 MG/5ML; MG/5ML
5 SUSPENSION, EXTENDED RELEASE ORAL EVERY 12 HOURS PRN
Qty: 70 ML | Refills: 0 | Status: SHIPPED | OUTPATIENT
Start: 2021-06-16

## 2021-06-16 RX ORDER — IPRATROPIUM BROMIDE AND ALBUTEROL SULFATE 2.5; .5 MG/3ML; MG/3ML
3 SOLUTION RESPIRATORY (INHALATION)
Status: COMPLETED | OUTPATIENT
Start: 2021-06-16 | End: 2021-06-16

## 2021-06-16 RX ORDER — AZITHROMYCIN 250 MG/1
250 TABLET, FILM COATED ORAL DAILY
Qty: 6 TABLET | Refills: 0 | Status: SHIPPED | OUTPATIENT
Start: 2021-06-16

## 2021-06-16 RX ORDER — LINCOMYCIN HYDROCHLORIDE 300 MG/ML
600 INJECTION, SOLUTION INTRAMUSCULAR; INTRAVENOUS; SUBCONJUNCTIVAL
Status: COMPLETED | OUTPATIENT
Start: 2021-06-16 | End: 2021-06-16

## 2021-06-16 RX ADMIN — METHYLPREDNISOLONE SODIUM SUCCINATE 125 MG: 125 INJECTION, POWDER, FOR SOLUTION INTRAMUSCULAR; INTRAVENOUS at 08:06

## 2021-06-16 RX ADMIN — LINCOMYCIN HYDROCHLORIDE 600 MG: 300 INJECTION, SOLUTION INTRAMUSCULAR; INTRAVENOUS; SUBCONJUNCTIVAL at 08:06

## 2021-06-16 RX ADMIN — IPRATROPIUM BROMIDE AND ALBUTEROL SULFATE 3 ML: .5; 3 SOLUTION RESPIRATORY (INHALATION) at 08:06

## 2021-06-19 ENCOUNTER — HOSPITAL ENCOUNTER (EMERGENCY)
Facility: HOSPITAL | Age: 51
Discharge: HOME | End: 2021-06-19
Attending: EMERGENCY MEDICINE
Payer: MEDICAID

## 2021-06-19 VITALS
HEART RATE: 60 BPM | SYSTOLIC BLOOD PRESSURE: 140 MMHG | WEIGHT: 138 LBS | RESPIRATION RATE: 20 BRPM | TEMPERATURE: 98 F | OXYGEN SATURATION: 95 % | BODY MASS INDEX: 24.45 KG/M2 | HEIGHT: 63 IN | DIASTOLIC BLOOD PRESSURE: 89 MMHG

## 2021-06-19 DIAGNOSIS — M54.50 ACUTE LOW BACK PAIN WITHOUT SCIATICA, UNSPECIFIED BACK PAIN LATERALITY: ICD-10-CM

## 2021-06-19 DIAGNOSIS — M51.37 DEGENERATIVE DISC DISEASE AT L5-S1 LEVEL: Primary | ICD-10-CM

## 2021-06-19 LAB
ALBUMIN SERPL BCP-MCNC: 3.7 G/DL (ref 3.5–5.2)
ALP SERPL-CCNC: 46 U/L (ref 55–135)
ALT SERPL W/O P-5'-P-CCNC: 23 U/L (ref 10–44)
ANION GAP SERPL CALC-SCNC: 9 MMOL/L (ref 8–16)
AST SERPL-CCNC: 34 U/L (ref 10–40)
B-HCG UR QL: NEGATIVE
BASOPHILS # BLD AUTO: 0.03 K/UL (ref 0–0.2)
BASOPHILS NFR BLD: 0.3 % (ref 0–1.9)
BILIRUB SERPL-MCNC: 0.5 MG/DL (ref 0.1–1)
BILIRUB UR QL STRIP: NEGATIVE
BUN SERPL-MCNC: 12 MG/DL (ref 6–20)
CALCIUM SERPL-MCNC: 8.8 MG/DL (ref 8.7–10.5)
CHLORIDE SERPL-SCNC: 99 MMOL/L (ref 95–110)
CK SERPL-CCNC: 253 U/L (ref 20–180)
CLARITY UR: CLEAR
CO2 SERPL-SCNC: 28 MMOL/L (ref 23–29)
COLOR UR: YELLOW
CREAT SERPL-MCNC: 0.6 MG/DL (ref 0.5–1.4)
CTP QC/QA: YES
DIFFERENTIAL METHOD: ABNORMAL
EOSINOPHIL # BLD AUTO: 0.3 K/UL (ref 0–0.5)
EOSINOPHIL NFR BLD: 3.5 % (ref 0–8)
ERYTHROCYTE [DISTWIDTH] IN BLOOD BY AUTOMATED COUNT: 12.4 % (ref 11.5–14.5)
EST. GFR  (AFRICAN AMERICAN): >60 ML/MIN/1.73 M^2
EST. GFR  (NON AFRICAN AMERICAN): >60 ML/MIN/1.73 M^2
GLUCOSE SERPL-MCNC: 88 MG/DL (ref 70–110)
GLUCOSE UR QL STRIP: NEGATIVE
HCG INTACT+B SERPL-ACNC: <0.6 MIU/ML
HCT VFR BLD AUTO: 34.5 % (ref 37–48.5)
HGB BLD-MCNC: 12.2 G/DL (ref 12–16)
HGB UR QL STRIP: NEGATIVE
IMM GRANULOCYTES # BLD AUTO: 0.03 K/UL (ref 0–0.04)
IMM GRANULOCYTES NFR BLD AUTO: 0.3 % (ref 0–0.5)
KETONES UR QL STRIP: NEGATIVE
LEUKOCYTE ESTERASE UR QL STRIP: NEGATIVE
LYMPHOCYTES # BLD AUTO: 3 K/UL (ref 1–4.8)
LYMPHOCYTES NFR BLD: 31.8 % (ref 18–48)
MCH RBC QN AUTO: 31.7 PG (ref 27–31)
MCHC RBC AUTO-ENTMCNC: 35.4 G/DL (ref 32–36)
MCV RBC AUTO: 90 FL (ref 82–98)
MONOCYTES # BLD AUTO: 0.8 K/UL (ref 0.3–1)
MONOCYTES NFR BLD: 8.4 % (ref 4–15)
NEUTROPHILS # BLD AUTO: 5.2 K/UL (ref 1.8–7.7)
NEUTROPHILS NFR BLD: 55.7 % (ref 38–73)
NITRITE UR QL STRIP: NEGATIVE
NRBC BLD-RTO: 0 /100 WBC
PH UR STRIP: 7 [PH] (ref 5–8)
PLATELET # BLD AUTO: 214 K/UL (ref 150–450)
PMV BLD AUTO: 10.2 FL (ref 9.2–12.9)
POTASSIUM SERPL-SCNC: 4 MMOL/L (ref 3.5–5.1)
PROT SERPL-MCNC: 6.9 G/DL (ref 6–8.4)
PROT UR QL STRIP: NEGATIVE
RBC # BLD AUTO: 3.85 M/UL (ref 4–5.4)
SODIUM SERPL-SCNC: 136 MMOL/L (ref 136–145)
SP GR UR STRIP: 1.01 (ref 1–1.03)
URN SPEC COLLECT METH UR: NORMAL
UROBILINOGEN UR STRIP-ACNC: NEGATIVE EU/DL
WBC # BLD AUTO: 9.41 K/UL (ref 3.9–12.7)

## 2021-06-19 PROCEDURE — 51701 INSERT BLADDER CATHETER: CPT

## 2021-06-19 PROCEDURE — 99285 EMERGENCY DEPT VISIT HI MDM: CPT | Mod: 25

## 2021-06-19 PROCEDURE — 82550 ASSAY OF CK (CPK): CPT | Performed by: EMERGENCY MEDICINE

## 2021-06-19 PROCEDURE — 96375 TX/PRO/DX INJ NEW DRUG ADDON: CPT | Mod: 59

## 2021-06-19 PROCEDURE — 96374 THER/PROPH/DIAG INJ IV PUSH: CPT | Mod: 59

## 2021-06-19 PROCEDURE — 36415 COLL VENOUS BLD VENIPUNCTURE: CPT | Performed by: EMERGENCY MEDICINE

## 2021-06-19 PROCEDURE — 81003 URINALYSIS AUTO W/O SCOPE: CPT | Performed by: EMERGENCY MEDICINE

## 2021-06-19 PROCEDURE — 96376 TX/PRO/DX INJ SAME DRUG ADON: CPT | Mod: 59

## 2021-06-19 PROCEDURE — 80053 COMPREHEN METABOLIC PANEL: CPT | Performed by: EMERGENCY MEDICINE

## 2021-06-19 PROCEDURE — 63600175 PHARM REV CODE 636 W HCPCS: Performed by: EMERGENCY MEDICINE

## 2021-06-19 PROCEDURE — 84702 CHORIONIC GONADOTROPIN TEST: CPT | Performed by: EMERGENCY MEDICINE

## 2021-06-19 PROCEDURE — 81025 URINE PREGNANCY TEST: CPT | Performed by: EMERGENCY MEDICINE

## 2021-06-19 PROCEDURE — 85025 COMPLETE CBC W/AUTO DIFF WBC: CPT | Performed by: EMERGENCY MEDICINE

## 2021-06-19 PROCEDURE — 96372 THER/PROPH/DIAG INJ SC/IM: CPT | Mod: 59

## 2021-06-19 RX ORDER — SODIUM CHLORIDE, SODIUM LACTATE, POTASSIUM CHLORIDE, CALCIUM CHLORIDE 600; 310; 30; 20 MG/100ML; MG/100ML; MG/100ML; MG/100ML
1000 INJECTION, SOLUTION INTRAVENOUS
Status: COMPLETED | OUTPATIENT
Start: 2021-06-19 | End: 2021-06-19

## 2021-06-19 RX ORDER — ONDANSETRON 2 MG/ML
4 INJECTION INTRAMUSCULAR; INTRAVENOUS
Status: COMPLETED | OUTPATIENT
Start: 2021-06-19 | End: 2021-06-19

## 2021-06-19 RX ORDER — KETOROLAC TROMETHAMINE 30 MG/ML
15 INJECTION, SOLUTION INTRAMUSCULAR; INTRAVENOUS
Status: COMPLETED | OUTPATIENT
Start: 2021-06-19 | End: 2021-06-19

## 2021-06-19 RX ORDER — METHOCARBAMOL 500 MG/1
500 TABLET, FILM COATED ORAL 3 TIMES DAILY
Qty: 21 TABLET | Refills: 0 | Status: SHIPPED | OUTPATIENT
Start: 2021-06-19 | End: 2021-06-26

## 2021-06-19 RX ORDER — HYDROMORPHONE HYDROCHLORIDE 1 MG/ML
0.5 INJECTION, SOLUTION INTRAMUSCULAR; INTRAVENOUS; SUBCUTANEOUS
Status: COMPLETED | OUTPATIENT
Start: 2021-06-19 | End: 2021-06-19

## 2021-06-19 RX ORDER — ORPHENADRINE CITRATE 30 MG/ML
60 INJECTION INTRAMUSCULAR; INTRAVENOUS
Status: COMPLETED | OUTPATIENT
Start: 2021-06-19 | End: 2021-06-19

## 2021-06-19 RX ORDER — LORAZEPAM 2 MG/ML
0.5 INJECTION INTRAMUSCULAR
Status: COMPLETED | OUTPATIENT
Start: 2021-06-19 | End: 2021-06-19

## 2021-06-19 RX ORDER — HYOSCYAMINE SULFATE 0.5 MG/ML
0.5 INJECTION, SOLUTION SUBCUTANEOUS
Status: COMPLETED | OUTPATIENT
Start: 2021-06-19 | End: 2021-06-19

## 2021-06-19 RX ORDER — MELOXICAM 7.5 MG/1
7.5 TABLET ORAL DAILY
Qty: 7 TABLET | Refills: 0 | Status: SHIPPED | OUTPATIENT
Start: 2021-06-19

## 2021-06-19 RX ORDER — METHYLPREDNISOLONE ACETATE 40 MG/ML
40 INJECTION, SUSPENSION INTRA-ARTICULAR; INTRALESIONAL; INTRAMUSCULAR; SOFT TISSUE
Status: COMPLETED | OUTPATIENT
Start: 2021-06-19 | End: 2021-06-19

## 2021-06-19 RX ADMIN — SODIUM CHLORIDE, SODIUM LACTATE, POTASSIUM CHLORIDE, AND CALCIUM CHLORIDE 1000 ML: .6; .31; .03; .02 INJECTION, SOLUTION INTRAVENOUS at 06:06

## 2021-06-19 RX ADMIN — HYOSCYAMINE SULFATE 0.5 MG: 0.5 INJECTION, SOLUTION SUBCUTANEOUS at 07:06

## 2021-06-19 RX ADMIN — HYDROMORPHONE HYDROCHLORIDE 0.5 MG: 1 INJECTION, SOLUTION INTRAMUSCULAR; INTRAVENOUS; SUBCUTANEOUS at 06:06

## 2021-06-19 RX ADMIN — KETOROLAC TROMETHAMINE 15 MG: 30 INJECTION, SOLUTION INTRAMUSCULAR; INTRAVENOUS at 07:06

## 2021-06-19 RX ADMIN — HYDROMORPHONE HYDROCHLORIDE 0.5 MG: 1 INJECTION, SOLUTION INTRAMUSCULAR; INTRAVENOUS; SUBCUTANEOUS at 07:06

## 2021-06-19 RX ADMIN — ORPHENADRINE CITRATE 60 MG: 60 INJECTION INTRAMUSCULAR; INTRAVENOUS at 12:06

## 2021-06-19 RX ADMIN — METHYLPREDNISOLONE ACETATE 40 MG: 40 INJECTION, SUSPENSION INTRA-ARTICULAR; INTRALESIONAL; INTRAMUSCULAR; SOFT TISSUE at 12:06

## 2021-06-19 RX ADMIN — ONDANSETRON 4 MG: 2 INJECTION INTRAMUSCULAR; INTRAVENOUS at 06:06

## 2021-06-19 RX ADMIN — LORAZEPAM 0.5 MG: 2 INJECTION INTRAMUSCULAR; INTRAVENOUS at 09:06

## 2021-06-22 ENCOUNTER — TELEPHONE (OUTPATIENT)
Dept: NEUROSURGERY | Facility: CLINIC | Age: 51
End: 2021-06-22

## 2021-06-23 ENCOUNTER — OFFICE VISIT (OUTPATIENT)
Dept: NEUROSURGERY | Facility: CLINIC | Age: 51
End: 2021-06-23
Payer: MEDICAID

## 2021-06-23 DIAGNOSIS — M54.16 LUMBAR RADICULOPATHY: Primary | ICD-10-CM

## 2021-06-23 PROCEDURE — 99204 PR OFFICE/OUTPT VISIT, NEW, LEVL IV, 45-59 MIN: ICD-10-PCS | Mod: 95,,, | Performed by: NURSE PRACTITIONER

## 2021-06-23 PROCEDURE — 99204 OFFICE O/P NEW MOD 45 MIN: CPT | Mod: 95,,, | Performed by: NURSE PRACTITIONER

## 2021-07-14 ENCOUNTER — TELEPHONE (OUTPATIENT)
Dept: FAMILY MEDICINE | Facility: CLINIC | Age: 51
End: 2021-07-14

## 2021-07-14 DIAGNOSIS — F51.02 ADJUSTMENT INSOMNIA: ICD-10-CM

## 2021-07-14 RX ORDER — ZOLPIDEM TARTRATE 10 MG/1
10 TABLET ORAL NIGHTLY
Qty: 30 TABLET | Refills: 0 | Status: SHIPPED | OUTPATIENT
Start: 2021-07-14 | End: 2021-08-12

## 2021-07-20 ENCOUNTER — CLINICAL SUPPORT (OUTPATIENT)
Dept: REHABILITATION | Facility: HOSPITAL | Age: 51
End: 2021-07-20
Payer: MEDICAID

## 2021-07-20 DIAGNOSIS — M54.16 LUMBAR RADICULOPATHY: ICD-10-CM

## 2021-07-20 DIAGNOSIS — M54.50 LOW BACK PAIN, UNSPECIFIED BACK PAIN LATERALITY, UNSPECIFIED CHRONICITY, UNSPECIFIED WHETHER SCIATICA PRESENT: ICD-10-CM

## 2021-07-20 PROCEDURE — 97161 PT EVAL LOW COMPLEX 20 MIN: CPT | Mod: PN

## 2021-07-27 ENCOUNTER — CLINICAL SUPPORT (OUTPATIENT)
Dept: REHABILITATION | Facility: HOSPITAL | Age: 51
End: 2021-07-27
Payer: MEDICAID

## 2021-07-27 DIAGNOSIS — M54.50 LOW BACK PAIN, UNSPECIFIED BACK PAIN LATERALITY, UNSPECIFIED CHRONICITY, UNSPECIFIED WHETHER SCIATICA PRESENT: ICD-10-CM

## 2021-07-27 PROCEDURE — 97110 THERAPEUTIC EXERCISES: CPT | Mod: PN

## 2021-07-29 ENCOUNTER — CLINICAL SUPPORT (OUTPATIENT)
Dept: REHABILITATION | Facility: HOSPITAL | Age: 51
End: 2021-07-29
Payer: MEDICAID

## 2021-07-29 DIAGNOSIS — M54.50 LOW BACK PAIN, UNSPECIFIED BACK PAIN LATERALITY, UNSPECIFIED CHRONICITY, UNSPECIFIED WHETHER SCIATICA PRESENT: ICD-10-CM

## 2021-07-29 PROCEDURE — 97110 THERAPEUTIC EXERCISES: CPT | Mod: PN,CQ

## 2021-08-25 ENCOUNTER — TELEPHONE (OUTPATIENT)
Dept: NEUROSURGERY | Facility: CLINIC | Age: 51
End: 2021-08-25

## 2021-09-17 DIAGNOSIS — F51.02 ADJUSTMENT INSOMNIA: ICD-10-CM

## 2021-09-20 RX ORDER — ZOLPIDEM TARTRATE 10 MG/1
TABLET ORAL
Qty: 90 TABLET | OUTPATIENT
Start: 2021-09-20

## 2021-10-22 RX ORDER — ZOLPIDEM TARTRATE 10 MG/1
TABLET ORAL
Qty: 30 TABLET | Refills: 0 | Status: SHIPPED | OUTPATIENT
Start: 2021-10-22 | End: 2021-12-29 | Stop reason: SDUPTHER

## 2021-12-29 ENCOUNTER — TELEPHONE (OUTPATIENT)
Dept: FAMILY MEDICINE | Facility: CLINIC | Age: 51
End: 2021-12-29
Payer: MEDICAID

## 2021-12-29 DIAGNOSIS — F51.02 ADJUSTMENT INSOMNIA: ICD-10-CM

## 2021-12-29 RX ORDER — ZOLPIDEM TARTRATE 10 MG/1
TABLET ORAL
Qty: 30 TABLET | Refills: 0 | Status: SHIPPED | OUTPATIENT
Start: 2021-12-29 | End: 2022-02-14 | Stop reason: SDUPTHER

## 2022-02-14 DIAGNOSIS — F51.02 ADJUSTMENT INSOMNIA: ICD-10-CM

## 2022-02-14 RX ORDER — ZOLPIDEM TARTRATE 10 MG/1
TABLET ORAL
Qty: 15 TABLET | Refills: 0 | Status: SHIPPED | OUTPATIENT
Start: 2022-02-14